# Patient Record
Sex: MALE | Race: WHITE | NOT HISPANIC OR LATINO | URBAN - METROPOLITAN AREA
[De-identification: names, ages, dates, MRNs, and addresses within clinical notes are randomized per-mention and may not be internally consistent; named-entity substitution may affect disease eponyms.]

---

## 2022-09-21 ENCOUNTER — OFFICE VISIT (OUTPATIENT)
Dept: URGENT CARE | Age: 32
End: 2022-09-21
Payer: COMMERCIAL

## 2022-09-21 VITALS
DIASTOLIC BLOOD PRESSURE: 85 MMHG | TEMPERATURE: 98.5 F | HEART RATE: 68 BPM | RESPIRATION RATE: 16 BRPM | OXYGEN SATURATION: 98 % | SYSTOLIC BLOOD PRESSURE: 153 MMHG

## 2022-09-21 DIAGNOSIS — R13.10 DYSPHAGIA, UNSPECIFIED TYPE: Primary | ICD-10-CM

## 2022-09-21 PROCEDURE — 99213 OFFICE O/P EST LOW 20 MIN: CPT

## 2022-09-21 NOTE — PROGRESS NOTES
[facility]        NAME: Colin Treadwell is a 34 y o  male  : 1990    MRN: [id number]  DATE: 2022  TIME: 6:28 PM    Assessment and Plan   Dysphagia, unspecified type [R13 10]  1  Dysphagia, unspecified type      69-year-old male presents for evaluation of difficulty swallowing  There is some bilateral, anterior cervical lymphadenopathy of the anterior cervical chain and submental chains  No obvious pharyngeal swelling or pathology, tracheal breath sounds clear  Will order GI consult as patient is describing the inability to swallow  Patient Instructions   Dysphagia   WHAT YOU NEED TO KNOW:   Dysphagia is trouble swallowing  You may have trouble moving food or liquid from your mouth to your esophagus or down to your stomach  You may have the problem when you eat, drink, or any time you try to swallow  Dysphagia can last a short time, or it can be a permanent problem         DISCHARGE INSTRUCTIONS:   Call your local emergency number (911 in the [address] [place] [address],[address]) if:   · You have chest pain      · You have shortness of breath      Return to the emergency department if:   · You choke on your saliva      · You cannot eat or drink liquids at all      Call your doctor or therapist if:   · You lose weight without trying      · Your signs and symptoms get worse, or you have new signs or symptoms      · You have signs or symptoms of dehydration, such as increased thirst, dark yellow urine, or little or no urine      · You get colds often      · You have questions or concerns about your condition or care      Nutrition:  You may need to change the texture of the foods you eat to help reduce choking problems  Your healthcare provider may show you how to thicken liquids or soften foods to make them easier to swallow  A therapist  can teach you different ways of swallowing by changing your head and body positions  You may be taught exercises to strengthen the muscles that help you swallow    Follow up with your doctor or therapist as directed:  Write down your questions so you remember to ask them during your visits  © Copyright [employer] 2022 Information is for End User's use only and may not be sold, redistributed or otherwise used for commercial purposes  All illustrations and images included in CareNotes® are the copyrighted property of A D A M , Inc  or Aurea Pettit   The above information is an  only  It is not intended as medical advice for individual conditions or treatments  Talk to your doctor, nurse or pharmacist before following any medical regimen to see if it is safe and effective for you            Follow up with PCP in 3-5 days  Proceed to  ER if symptoms worsen  Chief Complaint   No chief complaint on file  History of Present Illness       Patient is a 27-year-old male with no significant past medical history who presents for evaluation of difficulty swallowing over the past week  He reports particular difficulty with swallowing meats and more textured foods, and reports the sensation of neck swelling  Jamil Trevor He has been eating primarily soup and oatmeal   He denies sore throat, fever, neck pain or stiffness, cough, nausea/vomiting/diarrhea  Review of Systems   Review of Systems   Constitutional: Negative for chills, fatigue and fever  HENT: Negative for congestion, ear pain, postnasal drip, rhinorrhea, sinus pressure, sinus pain, sneezing and sore throat  Dysphagia   Eyes: Negative for pain and visual disturbance  Respiratory: Negative  Negative for apnea, cough, choking, chest tightness, shortness of breath, wheezing and stridor  Cardiovascular: Negative for chest pain and palpitations  Gastrointestinal: Negative for abdominal pain, diarrhea, nausea and vomiting  Endocrine: Negative  Genitourinary: Negative  Negative for dysuria and hematuria  Musculoskeletal: Negative for arthralgias, back pain, myalgias, neck pain and neck stiffness  Skin: Negative for color change and rash  Allergic/Immunologic: Negative  Negative for environmental allergies  Neurological: Negative  Negative for dizziness, seizures, syncope, facial asymmetry, light-headedness, numbness and headaches  Hematological: Negative  Negative for adenopathy  Psychiatric/Behavioral: Negative  All other systems reviewed and are negative  Current Medications     No current outpatient medications on file  Current Allergies     Allergies as of 09/21/2022    (Not on File)            The following portions of the patient's history were reviewed and updated as appropriate: allergies, current medications, past family history, past medical history, past social history, past surgical history and problem list      No past medical history on file  No past surgical history on file  No family history on file  Medications have been verified  Objective   There were no vitals taken for this visit  Physical Exam     Physical Exam  Vitals reviewed  Constitutional:       General: He is not in acute distress  Appearance: Normal appearance  He is not ill-appearing, toxic-appearing or diaphoretic  Interventions: He is not intubated  HENT:      Head: Normocephalic and atraumatic  Right Ear: Tympanic membrane, ear canal and external ear normal  There is no impacted cerumen  Left Ear: Tympanic membrane, ear canal and external ear normal  There is no impacted cerumen  Nose: Nose normal  No congestion or rhinorrhea  Mouth/Throat:      Mouth: Mucous membranes are moist       Pharynx: Oropharynx is clear  Uvula midline  No pharyngeal swelling, oropharyngeal exudate, posterior oropharyngeal erythema or uvula swelling  Tonsils: No tonsillar exudate or tonsillar abscesses  1+ on the right  1+ on the left  Eyes:      Extraocular Movements: Extraocular movements intact  Pupils: Pupils are equal, round, and reactive to light  Neck:      Thyroid: No thyroid mass, thyromegaly or thyroid tenderness  Cardiovascular:      Rate and Rhythm: Normal rate and regular rhythm  Pulses: Normal pulses  Heart sounds: Normal heart sounds, S1 normal and S2 normal  Heart sounds not distant  No murmur heard  No friction rub  No gallop  Pulmonary:      Effort: Pulmonary effort is normal  No tachypnea, bradypnea, accessory muscle usage, prolonged expiration, respiratory distress or retractions  He is not intubated  Breath sounds: Normal breath sounds  No stridor, decreased air movement or transmitted upper airway sounds  No decreased breath sounds, wheezing, rhonchi or rales  Chest:      Chest wall: No tenderness  Musculoskeletal:         General: Normal range of motion  Cervical back: Full passive range of motion without pain, normal range of motion and neck supple  No rigidity or tenderness  No spinous process tenderness or muscular tenderness  Normal range of motion  Lymphadenopathy:      Cervical: Cervical adenopathy present  Right cervical: Superficial cervical adenopathy present  No deep or posterior cervical adenopathy  Left cervical: Superficial cervical adenopathy present  No deep or posterior cervical adenopathy  Skin:     General: Skin is warm and dry  Capillary Refill: Capillary refill takes less than 2 seconds  Findings: No erythema  Neurological:      General: No focal deficit present  Mental Status: He is alert     Psychiatric:         Mood and Affect: Mood normal

## 2022-09-27 ENCOUNTER — APPOINTMENT (EMERGENCY)
Dept: CT IMAGING | Facility: HOSPITAL | Age: 32
End: 2022-09-27
Payer: COMMERCIAL

## 2022-09-27 ENCOUNTER — HOSPITAL ENCOUNTER (EMERGENCY)
Facility: HOSPITAL | Age: 32
Discharge: HOME | End: 2022-09-27
Attending: EMERGENCY MEDICINE
Payer: COMMERCIAL

## 2022-09-27 VITALS
DIASTOLIC BLOOD PRESSURE: 58 MMHG | TEMPERATURE: 98.8 F | RESPIRATION RATE: 16 BRPM | HEART RATE: 59 BPM | SYSTOLIC BLOOD PRESSURE: 118 MMHG | OXYGEN SATURATION: 100 %

## 2022-09-27 DIAGNOSIS — R13.10 DYSPHAGIA: Primary | ICD-10-CM

## 2022-09-27 LAB
ALBUMIN SERPL BCP-MCNC: 4.5 G/DL (ref 3.5–5)
ALP SERPL-CCNC: 56 U/L (ref 34–104)
ALT SERPL W P-5'-P-CCNC: 15 U/L (ref 7–52)
ANION GAP SERPL CALCULATED.3IONS-SCNC: 6 MMOL/L (ref 4–13)
AST SERPL W P-5'-P-CCNC: 17 U/L (ref 13–39)
BASOPHILS # BLD AUTO: 0.05 THOUSANDS/ΜL (ref 0–0.1)
BASOPHILS NFR BLD AUTO: 1 % (ref 0–1)
BILIRUB SERPL-MCNC: 1.07 MG/DL (ref 0.2–1)
BUN SERPL-MCNC: 14 MG/DL (ref 5–25)
CALCIUM SERPL-MCNC: 10.2 MG/DL (ref 8.4–10.2)
CHLORIDE SERPL-SCNC: 106 MMOL/L (ref 96–108)
CO2 SERPL-SCNC: 27 MMOL/L (ref 21–32)
CREAT SERPL-MCNC: 1.1 MG/DL (ref 0.6–1.3)
EOSINOPHIL # BLD AUTO: 0.07 THOUSAND/ΜL (ref 0–0.61)
EOSINOPHIL NFR BLD AUTO: 1 % (ref 0–6)
ERYTHROCYTE [DISTWIDTH] IN BLOOD BY AUTOMATED COUNT: 12.2 % (ref 11.6–15.1)
GFR SERPL CREATININE-BSD FRML MDRD: 87 ML/MIN/1.73SQ M
GLUCOSE SERPL-MCNC: 88 MG/DL (ref 65–140)
HCT VFR BLD AUTO: 43.8 % (ref 36.5–49.3)
HGB BLD-MCNC: 15.1 G/DL (ref 12–17)
IMM GRANULOCYTES # BLD AUTO: 0.02 THOUSAND/UL (ref 0–0.2)
IMM GRANULOCYTES NFR BLD AUTO: 0 % (ref 0–2)
LIPASE SERPL-CCNC: 18 U/L (ref 11–82)
LYMPHOCYTES # BLD AUTO: 1.67 THOUSANDS/ΜL (ref 0.6–4.47)
LYMPHOCYTES NFR BLD AUTO: 26 % (ref 14–44)
MCH RBC QN AUTO: 30.2 PG (ref 26.8–34.3)
MCHC RBC AUTO-ENTMCNC: 34.5 G/DL (ref 31.4–37.4)
MCV RBC AUTO: 88 FL (ref 82–98)
MONOCYTES # BLD AUTO: 0.42 THOUSAND/ΜL (ref 0.17–1.22)
MONOCYTES NFR BLD AUTO: 7 % (ref 4–12)
NEUTROPHILS # BLD AUTO: 4.1 THOUSANDS/ΜL (ref 1.85–7.62)
NEUTS SEG NFR BLD AUTO: 65 % (ref 43–75)
NRBC BLD AUTO-RTO: 0 /100 WBCS
PLATELET # BLD AUTO: 296 THOUSANDS/UL (ref 149–390)
PMV BLD AUTO: 9.3 FL (ref 8.9–12.7)
POTASSIUM SERPL-SCNC: 4 MMOL/L (ref 3.5–5.3)
PROT SERPL-MCNC: 7.4 G/DL (ref 6.4–8.4)
RBC # BLD AUTO: 5 MILLION/UL (ref 3.88–5.62)
SODIUM SERPL-SCNC: 139 MMOL/L (ref 135–147)
WBC # BLD AUTO: 6.33 THOUSAND/UL (ref 4.31–10.16)

## 2022-09-27 PROCEDURE — 70491 CT SOFT TISSUE NECK W/DYE: CPT

## 2022-09-27 PROCEDURE — 99284 EMERGENCY DEPT VISIT MOD MDM: CPT

## 2022-09-27 PROCEDURE — 80053 COMPREHEN METABOLIC PANEL: CPT | Performed by: EMERGENCY MEDICINE

## 2022-09-27 PROCEDURE — 85025 COMPLETE CBC W/AUTO DIFF WBC: CPT | Performed by: EMERGENCY MEDICINE

## 2022-09-27 PROCEDURE — 83690 ASSAY OF LIPASE: CPT | Performed by: EMERGENCY MEDICINE

## 2022-09-27 PROCEDURE — G1004 CDSM NDSC: HCPCS

## 2022-09-27 PROCEDURE — 36415 COLL VENOUS BLD VENIPUNCTURE: CPT

## 2022-09-27 RX ORDER — PANTOPRAZOLE SODIUM 40 MG/1
40 TABLET, DELAYED RELEASE ORAL DAILY
Qty: 30 TABLET | Refills: 0 | Status: SHIPPED | OUTPATIENT
Start: 2022-09-27 | End: 2022-09-27 | Stop reason: CLARIF

## 2022-09-27 RX ORDER — PANTOPRAZOLE SODIUM 40 MG/1
40 TABLET, DELAYED RELEASE ORAL DAILY
Qty: 30 TABLET | Refills: 0 | Status: SHIPPED | OUTPATIENT
Start: 2022-09-27 | End: 2022-10-27

## 2022-09-27 RX ADMIN — IOHEXOL 85 ML: 350 INJECTION, SOLUTION INTRAVENOUS at 19:52

## 2022-09-28 NOTE — DISCHARGE INSTRUCTIONS
CT NECK WITH CONTRAST     INDICATION:   Lymphadenopathy, neck  Dysphagia / Sore Throat  Patient complains of intermittent sore throat for one month  COMPARISON:  9/25/2012     TECHNIQUE:  Axial, sagittal, and coronal 2D reformatted images were created from the axial source data and submitted for interpretation  Radiation dose length product (DLP) for this visit:  830 mGy-cm   This examination, like all CT scans performed in the [facility], was performed utilizing techniques to minimize radiation dose exposure, including the use of iterative   reconstruction and automated exposure control  IV Contrast:  85 mL of iohexol (OMNIPAQUE)     IMAGE QUALITY:  Diagnostic  FINDINGS:     VISUALIZED BRAIN PARENCHYMA:  Within normal limits  VISUALIZED ORBITS AND PARANASAL SINUSES:  Within normal limits  NASAL CAVITY AND NASOPHARYNX:  Bilateral natacha bullosa  Midline septum  Otherwise within normal limits  No adenoid enlargement  SUPRAHYOID NECK:    Artifact from dental hardware appears visualization  Mild, symmetric enlargement of the tonsils for age  Preserved parapharyngeal fat  No fluid collections or masses  Normal appearance of the oral cavity, tongue base and epiglottis  INFRAHYOID NECK:  Aryepiglottic folds, piriform sinuses, glottis and subglottic airway are within normal limits  THYROID GLAND:  Within normal limits  PAROTID AND SUBMANDIBULAR GLANDS:  Within normal limits  LYMPH NODES:  No pathologic or enlarged adenopathy  VASCULAR STRUCTURES:  Normal enhancement of the cervical vasculature  THORACIC INLET:  Within normal limits  BONY STRUCTURES: Thoracic levoscoliosis  No suspicious bone lesions or acute fractures  IMPRESSION:     Tonsils are mildly enlarged for age  No mass, abscess or secondary signs of inflammation/infection  No suspicious lymphadenopathy

## 2022-09-28 NOTE — ED PROVIDER NOTES
History  Chief Complaint   Patient presents with    Sore Throat     Sore throat intermittently x 1 month, seen at urgent care last week wuithout swab  Denies fever    Abdominal Pain     Pain left of umbilicus  Started this week  Denies n/v/d  Does report losing weight over last week     Patient is a 56-year-old male presenting with 1 month of sore throat and difficulty swallowing  Patient states that he was in the ER few days ago and diagnosed with dysphagia was given a GI follow-up however they did not call him  Patient presents with similar signs and symptoms has a visit  Patient states that he is having difficulty swallowing foods only been lead liquid diet like soup but has had no difficulty swallowing fluids  Patient states that he has no nausea or vomiting also denies any fevers  Patient states he has mild abdominal pain but thinks that it is likely from him just being hungry  Patient is denies any constipation, diarrhea, chest pain, shortness of breath, fevers, headache, hematochezia, hemoptysis, or any other symptoms at this time          None       History reviewed  No pertinent past medical history  Past Surgical History:   Procedure Laterality Date    WISDOM TOOTH EXTRACTION         History reviewed  No pertinent family history  I have reviewed and agree with the history as documented  E-Cigarette/Vaping     E-Cigarette/Vaping Substances    Nicotine Yes     THC Yes      Social History     Tobacco Use    Smoking status: Current Some Day Smoker    Smokeless tobacco: Never Used   Substance Use Topics    Alcohol use: Not Currently    Drug use: Yes     Types: Marijuana       Review of Systems   Constitutional: Negative for chills and fever  HENT: Positive for trouble swallowing (Able to swallow fluids/liquid diet  difficulty with solid foods especially meat/steak)  Negative for congestion, drooling, ear discharge, ear pain, facial swelling, mouth sores, sinus pressure and sore throat  Eyes: Negative for photophobia, pain and visual disturbance  Respiratory: Negative for cough, choking, chest tightness, shortness of breath, wheezing and stridor  Cardiovascular: Negative for chest pain and palpitations  Gastrointestinal: Negative for abdominal pain, blood in stool, constipation, diarrhea, nausea and vomiting  Genitourinary: Negative for dysuria and hematuria  Musculoskeletal: Negative for arthralgias, back pain, neck pain and neck stiffness  Skin: Negative for color change and rash  Neurological: Negative for dizziness, seizures, syncope, weakness, numbness and headaches  All other systems reviewed and are negative  Physical Exam  Physical Exam  Vitals and nursing note reviewed  Constitutional:       General: He is not in acute distress  Appearance: He is well-developed  He is not ill-appearing  HENT:      Head: Normocephalic and atraumatic  Right Ear: Tympanic membrane and ear canal normal  No drainage  Left Ear: Tympanic membrane and ear canal normal  No drainage  Mouth/Throat:      Mouth: Mucous membranes are moist  No oral lesions  Pharynx: Oropharynx is clear  Uvula midline  No pharyngeal swelling, oropharyngeal exudate, posterior oropharyngeal erythema or uvula swelling  Tonsils: No tonsillar exudate or tonsillar abscesses  0 on the right  0 on the left  Eyes:      Extraocular Movements:      Right eye: Normal extraocular motion  Left eye: Normal extraocular motion  Conjunctiva/sclera: Conjunctivae normal       Pupils: Pupils are equal, round, and reactive to light  Neck:      Thyroid: No thyromegaly  Cardiovascular:      Rate and Rhythm: Normal rate and regular rhythm  Heart sounds: Normal heart sounds  No murmur heard  No friction rub  No gallop  Pulmonary:      Effort: Pulmonary effort is normal  No respiratory distress  Breath sounds: Normal breath sounds  No stridor  No wheezing, rhonchi or rales  Chest:      Chest wall: No tenderness  Abdominal:      General: Bowel sounds are normal  There is no distension  Palpations: Abdomen is soft  There is no mass  Tenderness: There is no abdominal tenderness  There is no guarding or rebound  Hernia: No hernia is present  Musculoskeletal:      Cervical back: Normal range of motion and neck supple  Lymphadenopathy:      Cervical: Cervical adenopathy (1 small swollen node under maxilla - nontender) present  Skin:     General: Skin is warm and dry  Capillary Refill: Capillary refill takes less than 2 seconds  Neurological:      Mental Status: He is alert           Vital Signs  ED Triage Vitals [09/27/22 1724]   Temperature Pulse Respirations Blood Pressure SpO2   98 8 °F (37 1 °C) 69 18 140/81 99 %      Temp Source Heart Rate Source Patient Position - Orthostatic VS BP Location FiO2 (%)   Oral Monitor Sitting Left arm --      Pain Score       --           Vitals:    09/27/22 1724 09/27/22 2021   BP: 140/81 118/58   Pulse: 69 59   Patient Position - Orthostatic VS: Sitting          Visual Acuity      ED Medications  Medications   iohexol (OMNIPAQUE) 350 MG/ML injection (SINGLE-DOSE) 100 mL (85 mL Intravenous Given 9/27/22 1952)       Diagnostic Studies  Results Reviewed     Procedure Component Value Units Date/Time    Comprehensive metabolic panel [[id number]]  (Abnormal) Collected: 09/27/22 1727    Lab Status: Final result Specimen: Blood from Arm, Left Updated: 09/27/22 1802     Sodium 139 mmol/L      Potassium 4 0 mmol/L      Chloride 106 mmol/L      CO2 27 mmol/L      ANION GAP 6 mmol/L      BUN 14 mg/dL      Creatinine 1 10 mg/dL      Glucose 88 mg/dL      Calcium 10 2 mg/dL      AST 17 U/L      ALT 15 U/L      Alkaline Phosphatase 56 U/L      Total Protein 7 4 g/dL      Albumin 4 5 g/dL      Total Bilirubin 1 07 mg/dL      eGFR 87 ml/min/1 73sq m     Narrative:      [place] guidelines for Chronic Kidney Disease (CKD):   Stage 1 with normal or high GFR (GFR > 90 mL/min/1 73 square meters)    Stage 2 Mild CKD (GFR = 60-89 mL/min/1 73 square meters)    Stage 3A Moderate CKD (GFR = 45-59 mL/min/1 73 square meters)    Stage 3B Moderate CKD (GFR = 30-44 mL/min/1 73 square meters)    Stage 4 Severe CKD (GFR = 15-29 mL/min/1 73 square meters)    Stage 5 End Stage CKD (GFR <15 mL/min/1 73 square meters)  Note: GFR calculation is accurate only with a steady state creatinine    Lipase [[id number]]  (Normal) Collected: 09/27/22 1727    Lab Status: Final result Specimen: Blood from Arm, Left Updated: 09/27/22 1802     Lipase 18 u/L     CBC and differential [[id number]] Collected: 09/27/22 1727    Lab Status: Final result Specimen: Blood from Arm, Left Updated: 09/27/22 1739     WBC 6 33 Thousand/uL      RBC 5 00 Million/uL      Hemoglobin 15 1 g/dL      Hematocrit 43 8 %      MCV 88 fL      MCH 30 2 pg      MCHC 34 5 g/dL      RDW 12 2 %      MPV 9 3 fL      Platelets 342 Thousands/uL      nRBC 0 /100 WBCs      Neutrophils Relative 65 %      Immat GRANS % 0 %      Lymphocytes Relative 26 %      Monocytes Relative 7 %      Eosinophils Relative 1 %      Basophils Relative 1 %      Neutrophils Absolute 4 10 Thousands/µL      Immature Grans Absolute 0 02 Thousand/uL      Lymphocytes Absolute 1 67 Thousands/µL      Monocytes Absolute 0 42 Thousand/µL      Eosinophils Absolute 0 07 Thousand/µL      Basophils Absolute 0 05 Thousands/µL                  CT soft tissue neck   Final Result by Zaira Kimball MD (09/27 2041)      Tonsils are mildly enlarged for age  No mass, abscess or secondary signs of inflammation/infection  No suspicious lymphadenopathy        Workstation performed: [id number]                    Procedures  Procedures         ED Course  ED Course as of 09/28/22 1106   Tue Sep 27, 2022   1940 CT soft tissue neck                                             MDM  Number of Diagnoses or Management Options  Dysphagia  Diagnosis management comments: 58-year-old male presenting with signs and symptoms of dysphagia  Patient is still able to swallow fluids as well as liquid diet  CT soft tissue neck to rule out any acute process  Came back as negative for any acute process or infection  Patient was given another follow-up with GI as well as given the phone number  Patient instructed to call them by the end of today around 2:00 p m  if he does not get a call from GI  Patient instructed that he needs an endoscopy to diagnose the issue  Patient also stated that he had mild esophageal burning with  Patient was given a trial of Protonix to help with his symptoms         Disposition  Final diagnoses:   Dysphagia     Time reflects when diagnosis was documented in both MDM as applicable and the Disposition within this note     Time User Action Codes Description Comment    9/27/2022  8:44 PM Debbie Caputo Add [R13 10] Dysphagia       ED Disposition     ED Disposition   Discharge    Condition   Stable    Date/Time   Tue Sep 27, 2022  8:44 PM    Comment   Stefano Craft discharge to home/self care                 Follow-up Information     Follow up With Specialties Details Why Contact Info Additional [address] Emergency Department Emergency Medicine Go to  If symptoms worsen [address] Emergency Department, [address], [place], [place], [address]    [facility] Specialty [facility] Gastroenterology Call  As needed [id number] [facility] [address] [facility] Gastroenterology Specialists [facility], [address], [address] [place], [place], [address], [phone]          Discharge Medication List as of 9/27/2022  8:54 PM      START taking these medications    Details   pantoprazole (PROTONIX) 40 mg tablet Take 1 tablet (40 mg total) by mouth daily, Starting Tue 9/27/2022, Until Thu 10/27/2022, Normal                 PDMP Review     None          ED Provider  Electronically Signed by           Jamil Moraes PA-C  09/28/22 1512

## 2022-10-03 ENCOUNTER — HOSPITAL ENCOUNTER (EMERGENCY)
Facility: HOSPITAL | Age: 32
Discharge: LEFT BEFORE DISCHARGE | End: 2022-10-04
Payer: COMMERCIAL

## 2022-10-03 VITALS
OXYGEN SATURATION: 99 % | TEMPERATURE: 98.1 F | SYSTOLIC BLOOD PRESSURE: 160 MMHG | RESPIRATION RATE: 18 BRPM | DIASTOLIC BLOOD PRESSURE: 83 MMHG | HEART RATE: 76 BPM

## 2022-10-03 LAB
ALBUMIN SERPL BCP-MCNC: 4.8 G/DL (ref 3.5–5)
ALP SERPL-CCNC: 58 U/L (ref 34–104)
ALT SERPL W P-5'-P-CCNC: 13 U/L (ref 7–52)
ANION GAP SERPL CALCULATED.3IONS-SCNC: 9 MMOL/L (ref 4–13)
AST SERPL W P-5'-P-CCNC: 17 U/L (ref 13–39)
BASOPHILS # BLD AUTO: 0.03 THOUSANDS/ΜL (ref 0–0.1)
BASOPHILS NFR BLD AUTO: 1 % (ref 0–1)
BILIRUB SERPL-MCNC: 1.26 MG/DL (ref 0.2–1)
BUN SERPL-MCNC: 17 MG/DL (ref 5–25)
CALCIUM SERPL-MCNC: 9.9 MG/DL (ref 8.4–10.2)
CARDIAC TROPONIN I PNL SERPL HS: <2 NG/L
CHLORIDE SERPL-SCNC: 102 MMOL/L (ref 96–108)
CO2 SERPL-SCNC: 26 MMOL/L (ref 21–32)
CREAT SERPL-MCNC: 1.07 MG/DL (ref 0.6–1.3)
EOSINOPHIL # BLD AUTO: 0.05 THOUSAND/ΜL (ref 0–0.61)
EOSINOPHIL NFR BLD AUTO: 1 % (ref 0–6)
ERYTHROCYTE [DISTWIDTH] IN BLOOD BY AUTOMATED COUNT: 11.9 % (ref 11.6–15.1)
GFR SERPL CREATININE-BSD FRML MDRD: 90 ML/MIN/1.73SQ M
GLUCOSE SERPL-MCNC: 92 MG/DL (ref 65–140)
HCT VFR BLD AUTO: 44.7 % (ref 36.5–49.3)
HGB BLD-MCNC: 15.5 G/DL (ref 12–17)
IMM GRANULOCYTES # BLD AUTO: 0.01 THOUSAND/UL (ref 0–0.2)
IMM GRANULOCYTES NFR BLD AUTO: 0 % (ref 0–2)
LYMPHOCYTES # BLD AUTO: 1.15 THOUSANDS/ΜL (ref 0.6–4.47)
LYMPHOCYTES NFR BLD AUTO: 26 % (ref 14–44)
MCH RBC QN AUTO: 29.9 PG (ref 26.8–34.3)
MCHC RBC AUTO-ENTMCNC: 34.7 G/DL (ref 31.4–37.4)
MCV RBC AUTO: 86 FL (ref 82–98)
MONOCYTES # BLD AUTO: 0.31 THOUSAND/ΜL (ref 0.17–1.22)
MONOCYTES NFR BLD AUTO: 7 % (ref 4–12)
NEUTROPHILS # BLD AUTO: 2.95 THOUSANDS/ΜL (ref 1.85–7.62)
NEUTS SEG NFR BLD AUTO: 65 % (ref 43–75)
NRBC BLD AUTO-RTO: 0 /100 WBCS
PLATELET # BLD AUTO: 288 THOUSANDS/UL (ref 149–390)
PMV BLD AUTO: 9.2 FL (ref 8.9–12.7)
POTASSIUM SERPL-SCNC: 4 MMOL/L (ref 3.5–5.3)
PROT SERPL-MCNC: 7.6 G/DL (ref 6.4–8.4)
RBC # BLD AUTO: 5.18 MILLION/UL (ref 3.88–5.62)
SODIUM SERPL-SCNC: 137 MMOL/L (ref 135–147)
WBC # BLD AUTO: 4.5 THOUSAND/UL (ref 4.31–10.16)

## 2022-10-03 PROCEDURE — 93005 ELECTROCARDIOGRAM TRACING: CPT

## 2022-10-03 PROCEDURE — 85025 COMPLETE CBC W/AUTO DIFF WBC: CPT

## 2022-10-03 PROCEDURE — 84484 ASSAY OF TROPONIN QUANT: CPT

## 2022-10-03 PROCEDURE — 80053 COMPREHEN METABOLIC PANEL: CPT

## 2022-10-03 PROCEDURE — 36415 COLL VENOUS BLD VENIPUNCTURE: CPT

## 2022-10-04 LAB
ATRIAL RATE: 92 BPM
P AXIS: 70 DEGREES
PR INTERVAL: 146 MS
QRS AXIS: 84 DEGREES
QRSD INTERVAL: 100 MS
QT INTERVAL: 356 MS
QTC INTERVAL: 440 MS
T WAVE AXIS: 37 DEGREES
VENTRICULAR RATE: 92 BPM

## 2022-10-04 PROCEDURE — 93010 ELECTROCARDIOGRAM REPORT: CPT | Performed by: INTERNAL MEDICINE

## 2022-10-27 ENCOUNTER — OFFICE VISIT (OUTPATIENT)
Dept: INTERNAL MEDICINE CLINIC | Facility: CLINIC | Age: 32
End: 2022-10-27

## 2022-10-27 VITALS
SYSTOLIC BLOOD PRESSURE: 126 MMHG | WEIGHT: 221.6 LBS | HEIGHT: 75 IN | OXYGEN SATURATION: 99 % | BODY MASS INDEX: 27.55 KG/M2 | HEART RATE: 85 BPM | DIASTOLIC BLOOD PRESSURE: 84 MMHG

## 2022-10-27 DIAGNOSIS — Z13.31 POSITIVE DEPRESSION SCREENING: ICD-10-CM

## 2022-10-27 DIAGNOSIS — F41.1 GAD (GENERALIZED ANXIETY DISORDER): ICD-10-CM

## 2022-10-27 DIAGNOSIS — R13.19 ESOPHAGEAL DYSPHAGIA: Primary | ICD-10-CM

## 2022-10-27 NOTE — PROGRESS NOTES
Assessment/Plan:    1  Esophageal dysphagia    2  YOSI (generalized anxiety disorder)    3  Positive depression screening    The case discussed with patient using patient centered shared decision making  The patient was counseled regarding instructions for management,-- risk factor reductions,-- prognosis,-- impressions,-- risks and benefits of treatment options,-- importance of compliance with treatment  I have reviewed the instructions with the patient, answering all questions to his satisfaction  Exam unrevealing  Patient well nourished, euvolemic    Scheduled with Gastro next week  Will need EGD for r/o HH, esophageal stricture, gastritis, esophagitis    Advised soft bland diet    Pt declines rx/counseling for depression, anxiety    Recheck in 2 weeks    BMI Counseling: Body mass index is 27 7 kg/m²  The BMI is above normal  Nutrition recommendations include decreasing portion sizes, moderation in carbohydrate intake and increasing intake of lean protein  Exercise recommendations include exercising 3-5 times per week  Rationale for BMI follow-up plan is due to patient being overweight or obese  Depression Screening and Follow-up Plan: Patient's depression screening was positive with a PHQ-2 score of 4  Their PHQ-9 score was 16  Depression Screening Follow-up Plan: Patient's depression screening was positive with a PHQ-2 score of 4  Their PHQ-9 score was 16  Patient declines further evaluation by mental health professional and/or medications  They have no active suicidal ideations  Brief counseling provided and recommend additional follow-up/re-evaluation at next office visit  There are no Patient Instructions on file for this visit  Return in about 2 weeks (around 11/10/2022)      I have spent 35 minutes with Patient  today in which greater than 50% of this time was spent in counseling/coordination of care regarding Diagnostic results, Prognosis, Risks and benefits of tx options, Intructions for management, Patient and family education, Importance of tx compliance, Risk factor reductions and Impressions  Subjective:      Patient ID: Magali Cranker is a 34 y o  male  Chief Complaint   Patient presents with   • Abdominal Pain   • Heartburn   • Numbness     Left hand   • Weight Loss     30 lbs over 1 5 months, unable to swallow  • Anxiety       Patient presents for multiple c/o    History obtained from chart review and the patient  Patient reports ongoing swallowing problems for approx 6 weeks  Food gets stuck   Often vomits  Has generalized abd pain which comes and goes  Better with mylanta  Now following a liquid diet  Having constipation  Was seen by [facility] for same x 2    Has recently intentionally lost weight in past 2 years--was 380 pounds  Denies h/o GERD, PUD, esophagitis, IBD    Additionally having adjustment reaction/anxiety since covid, mother's cancer diagnosis  Was drinking heavily, uses [employer]    Has since quit ETOH          The following portions of the patient's history were reviewed and updated as appropriate: allergies, current medications, past family history, past medical history, past social history, past surgical history and problem list     Review of Systems   Constitutional: Positive for fatigue  Negative for fever and unexpected weight change  HENT: Positive for trouble swallowing  Respiratory: Negative  Cardiovascular: Negative for chest pain and palpitations  Gastrointestinal: Positive for abdominal distention, abdominal pain, constipation, nausea and vomiting  Negative for blood in stool  Neurological: Negative  Psychiatric/Behavioral: Positive for dysphoric mood  Negative for sleep disturbance and suicidal ideas  The patient is nervous/anxious            Current Outpatient Medications   Medication Sig Dispense Refill   • pantoprazole (PROTONIX) 40 mg tablet Take 1 tablet (40 mg total) by mouth daily (Patient not taking: Reported on 10/27/2022) 30 tablet 0     No current facility-administered medications for this visit  Objective:    /84   Pulse 85   Ht 6' 3" (1 905 m)   Wt 101 kg (221 lb 9 6 oz)   SpO2 99%   BMI 27 70 kg/m²        Physical Exam  Vitals and nursing note reviewed  Constitutional:       General: He is not in acute distress  Appearance: He is well-developed  He is not ill-appearing  HENT:      Head: Normocephalic and atraumatic  Cardiovascular:      Rate and Rhythm: Normal rate and regular rhythm  Heart sounds: Normal heart sounds  Pulmonary:      Effort: Pulmonary effort is normal       Breath sounds: Normal breath sounds  Abdominal:      General: Bowel sounds are normal  There is no distension  Palpations: Abdomen is soft  There is no hepatomegaly, splenomegaly or mass  Tenderness: There is no abdominal tenderness  There is no guarding or rebound  Hernia: No hernia is present  Skin:     General: Skin is warm and dry  Coloration: Skin is not pale  Neurological:      General: No focal deficit present  Mental Status: He is alert     Psychiatric:         Mood and Affect: Mood normal                 Brandon Thornton

## 2022-11-03 ENCOUNTER — APPOINTMENT (OUTPATIENT)
Dept: RADIOLOGY | Facility: HOSPITAL | Age: 32
End: 2022-11-03

## 2022-11-03 ENCOUNTER — HOSPITAL ENCOUNTER (EMERGENCY)
Facility: HOSPITAL | Age: 32
Discharge: HOME | End: 2022-11-03
Attending: EMERGENCY MEDICINE

## 2022-11-03 VITALS
SYSTOLIC BLOOD PRESSURE: 133 MMHG | RESPIRATION RATE: 18 BRPM | BODY MASS INDEX: 26.11 KG/M2 | TEMPERATURE: 99.4 F | WEIGHT: 210 LBS | OXYGEN SATURATION: 100 % | DIASTOLIC BLOOD PRESSURE: 65 MMHG | HEART RATE: 74 BPM | HEIGHT: 75 IN

## 2022-11-03 DIAGNOSIS — Z77.098 ACCIDENTAL EXPOSURE TO CARBON MONOXIDE: Primary | ICD-10-CM

## 2022-11-03 DIAGNOSIS — R51.9 HEADACHE: ICD-10-CM

## 2022-11-03 LAB
ALBUMIN SERPL BCP-MCNC: 4.4 G/DL (ref 3.5–5)
ALP SERPL-CCNC: 66 U/L (ref 46–116)
ALT SERPL W P-5'-P-CCNC: 24 U/L (ref 12–78)
ANION GAP SERPL CALCULATED.3IONS-SCNC: 11 MMOL/L (ref 4–13)
BASOPHILS # BLD AUTO: 0.06 THOUSANDS/ÂΜL (ref 0–0.1)
BASOPHILS NFR BLD AUTO: 1 % (ref 0–1)
BILIRUB SERPL-MCNC: 0.9 MG/DL (ref 0.2–1)
BUN SERPL-MCNC: 15 MG/DL (ref 5–25)
CALCIUM SERPL-MCNC: 9.1 MG/DL (ref 8.3–10.1)
CARDIAC TROPONIN I PNL SERPL HS: <2 NG/L
CHLORIDE SERPL-SCNC: 104 MMOL/L (ref 96–108)
CO2 SERPL-SCNC: 25 MMOL/L (ref 21–32)
CREAT SERPL-MCNC: 1.01 MG/DL (ref 0.6–1.3)
EOSINOPHIL # BLD AUTO: 0.07 THOUSAND/ÂΜL (ref 0–0.61)
EOSINOPHIL NFR BLD AUTO: 1 % (ref 0–6)
ERYTHROCYTE [DISTWIDTH] IN BLOOD BY AUTOMATED COUNT: 11.5 % (ref 11.6–15.1)
GAS + CO PNL BLDA: 3 % (ref 0–1.5)
GFR SERPL CREATININE-BSD FRML MDRD: 97 ML/MIN/1.73SQ M
GLUCOSE SERPL-MCNC: 110 MG/DL (ref 65–140)
HCT VFR BLD AUTO: 43.1 % (ref 36.5–49.3)
HGB BLD-MCNC: 15.3 G/DL (ref 12–17)
IMM GRANULOCYTES # BLD AUTO: 0.02 THOUSAND/UL (ref 0–0.2)
IMM GRANULOCYTES NFR BLD AUTO: 0 % (ref 0–2)
LYMPHOCYTES # BLD AUTO: 1.46 THOUSANDS/ÂΜL (ref 0.6–4.47)
LYMPHOCYTES NFR BLD AUTO: 23 % (ref 14–44)
MCH RBC QN AUTO: 30.7 PG (ref 26.8–34.3)
MCHC RBC AUTO-ENTMCNC: 35.5 G/DL (ref 31.4–37.4)
MCV RBC AUTO: 86 FL (ref 82–98)
MONOCYTES # BLD AUTO: 0.34 THOUSAND/ÂΜL (ref 0.17–1.22)
MONOCYTES NFR BLD AUTO: 5 % (ref 4–12)
NEUTROPHILS # BLD AUTO: 4.29 THOUSANDS/ÂΜL (ref 1.85–7.62)
NEUTS SEG NFR BLD AUTO: 70 % (ref 43–75)
NRBC BLD AUTO-RTO: 0 /100 WBCS
PLATELET # BLD AUTO: 285 THOUSANDS/UL (ref 149–390)
PMV BLD AUTO: 9.4 FL (ref 8.9–12.7)
POTASSIUM SERPL-SCNC: 4.1 MMOL/L (ref 3.5–5.3)
PROT SERPL-MCNC: 7.7 G/DL (ref 6.4–8.4)
RBC # BLD AUTO: 4.99 MILLION/UL (ref 3.88–5.62)
SODIUM SERPL-SCNC: 140 MMOL/L (ref 135–147)
WBC # BLD AUTO: 6.24 THOUSAND/UL (ref 4.31–10.16)

## 2022-11-03 NOTE — ED PROVIDER NOTES
History  Chief Complaint   Patient presents with   • Headache     Pt reports that on and off for the past couple weeks he has been experiencing headache, sob, and some chest pain "every other day"  States that he is a , felt better and then went back to work  Is requesting to be tested for carbon monoxide  58-year-old male presents for evaluation of headache, facial flushing and concern for possible exposure to carbon monoxide  Patient works indoors with a forklift  He has been getting intermittent headaches  Patient states symptoms are significantly improved since leaving work but still has a mild frontal headache  No pain medications prior to arrival   Patient states he also smokes daily  Prior to Admission Medications   Prescriptions Last Dose Informant Patient Reported? Taking? pantoprazole (PROTONIX) 40 mg tablet   No No   Sig: Take 1 tablet (40 mg total) by mouth daily   Patient not taking: Reported on 10/27/2022      Facility-Administered Medications: None       History reviewed  No pertinent past medical history  Past Surgical History:   Procedure Laterality Date   • WISDOM TOOTH EXTRACTION         Family History   Problem Relation Age of Onset   • Cancer Mother    • Breast cancer Mother    • No Known Problems Brother    • No Known Problems Brother      I have reviewed and agree with the history as documented  E-Cigarette/Vaping   • E-Cigarette Use Current Every Day User      E-Cigarette/Vaping Substances   • Nicotine Yes    • THC Yes    • CBD No    • Flavoring No    • Other No    • Unknown No      Social History     Tobacco Use   • Smoking status: Current Some Day Smoker     Types: Cigarettes   • Smokeless tobacco: Never Used   Vaping Use   • Vaping Use: Every day   • Substances: Nicotine, THC   Substance Use Topics   • Alcohol use: Not Currently   • Drug use: Yes     Types: Marijuana       Review of Systems   Constitutional: Negative for fever     Neurological: Positive for headaches  All other systems reviewed and are negative  Physical Exam  Physical Exam  Vitals and nursing note reviewed  Constitutional:       General: He is not in acute distress  Appearance: He is well-developed  HENT:      Head: Normocephalic and atraumatic  Right Ear: External ear normal       Left Ear: External ear normal       Nose: Nose normal    Eyes:      General: No scleral icterus  Extraocular Movements: Extraocular movements intact  Pupils: Pupils are equal, round, and reactive to light  Pulmonary:      Effort: Pulmonary effort is normal  No respiratory distress  Abdominal:      General: There is no distension  Palpations: Abdomen is soft  Musculoskeletal:         General: No deformity  Normal range of motion  Cervical back: Normal range of motion and neck supple  Comments: 5/5 strength of bilateral upper and lower extremities  Skin:     General: Skin is warm  Findings: No rash  Neurological:      General: No focal deficit present  Mental Status: He is alert        Gait: Gait normal    Psychiatric:         Mood and Affect: Mood normal          Vital Signs  ED Triage Vitals   Temperature Pulse Respirations Blood Pressure SpO2   11/03/22 1335 11/03/22 1335 11/03/22 1335 11/03/22 1335 11/03/22 1335   99 4 °F (37 4 °C) 91 18 139/83 98 %      Temp Source Heart Rate Source Patient Position - Orthostatic VS BP Location FiO2 (%)   11/03/22 1335 11/03/22 1335 11/03/22 1335 11/03/22 1335 --   Temporal Monitor Sitting Left arm       Pain Score       11/03/22 1532       No Pain           Vitals:    11/03/22 1335 11/03/22 1532 11/03/22 1810   BP: 139/83 140/76 133/65   Pulse: 91 77 74   Patient Position - Orthostatic VS: Sitting Sitting Sitting         Visual Acuity      ED Medications  Medications - No data to display    Diagnostic Studies  Results Reviewed     Procedure Component Value Units Date/Time    Carboxyhemoglobin [[id number]]  (Abnormal) Collected: 11/03/22 1803    Lab Status: Final result Specimen: Blood from Arm, Right Updated: 11/03/22 1829     Carbon Monoxide, Blood 3 0 %     Narrative:       Therapeutic levels (1 mg/mL and 2 mg/mL) of hydroxocobalamin may interfere with the fCOHb and fMetHb where it may cause lower than expected values  Normal Carboxyhemoglobin range for nonsmokers is <1 5%   Normal Carboxyhemoglobin range for smokers is 1 5% to 5 1%     Comprehensive metabolic panel [[id number]] Collected: 11/03/22 1338    Lab Status: Final result Specimen: Blood from Arm, Right Updated: 11/03/22 1424     Sodium 140 mmol/L      Potassium 4 1 mmol/L      Chloride 104 mmol/L      CO2 25 mmol/L      ANION GAP 11 mmol/L      BUN 15 mg/dL      Creatinine 1 01 mg/dL      Glucose 110 mg/dL      Calcium 9 1 mg/dL      AST --     ALT 24 U/L      Alkaline Phosphatase 66 U/L      Total Protein 7 7 g/dL      Albumin 4 4 g/dL      Total Bilirubin 0 90 mg/dL      eGFR 97 ml/min/1 73sq m     Narrative:      [facility] guidelines for Chronic Kidney Disease (CKD):   •  Stage 1 with normal or high GFR (GFR > 90 mL/min/1 73 square meters)  •  Stage 2 Mild CKD (GFR = 60-89 mL/min/1 73 square meters)  •  Stage 3A Moderate CKD (GFR = 45-59 mL/min/1 73 square meters)  •  Stage 3B Moderate CKD (GFR = 30-44 mL/min/1 73 square meters)  •  Stage 4 Severe CKD (GFR = 15-29 mL/min/1 73 square meters)  •  Stage 5 End Stage CKD (GFR <15 mL/min/1 73 square meters)  Note: GFR calculation is accurate only with a steady state creatinine    HS Troponin 0hr (reflex protocol) [[id number]]  (Normal) Collected: 11/03/22 1338    Lab Status: Final result Specimen: Blood from Arm, Right Updated: 11/03/22 1412     hs TnI 0hr <2 ng/L     CBC and differential [[id number]]  (Abnormal) Collected: 11/03/22 1338    Lab Status: Final result Specimen: Blood from Arm, Right Updated: 11/03/22 1345     WBC 6 24 Thousand/uL      RBC 4 99 Million/uL      Hemoglobin 15 3 g/dL Hematocrit 43 1 %      MCV 86 fL      MCH 30 7 pg      MCHC 35 5 g/dL      RDW 11 5 %      MPV 9 4 fL      Platelets 409 Thousands/uL      nRBC 0 /100 WBCs      Neutrophils Relative 70 %      Immat GRANS % 0 %      Lymphocytes Relative 23 %      Monocytes Relative 5 %      Eosinophils Relative 1 %      Basophils Relative 1 %      Neutrophils Absolute 4 29 Thousands/µL      Immature Grans Absolute 0 02 Thousand/uL      Lymphocytes Absolute 1 46 Thousands/µL      Monocytes Absolute 0 34 Thousand/µL      Eosinophils Absolute 0 07 Thousand/µL      Basophils Absolute 0 06 Thousands/µL                  XR chest pa & lateral   Final Result by Moi Gutierrez MD (11/03 1631)      No acute cardiopulmonary disease  Workstation performed: [id number]                    Procedures  Procedures         ED Course             HEART Risk Score    Flowsheet Row Most Recent Value   Heart Score Risk Calculator    History 0 Filed at: 11/03/2022 1921   ECG 0 Filed at: 11/03/2022 1921   Age 0 Filed at: 11/03/2022 1921   Risk Factors 1 Filed at: 11/03/2022 1921   Troponin 0 Filed at: 11/03/2022 1921   HEART Score 1 Filed at: 11/03/2022 1921                                      MDM  Number of Diagnoses or Management Options  Accidental exposure to carbon monoxide  Headache: new and requires workup  Diagnosis management comments: 79-year-old male presenting with headache, facial flushing and concern for carbon monoxide and  Nasal cannula oxygen administered  The spot call oximetry reveals 4-7%  Will send blood carboxyhemoglobin  Patient was complete resolution of symptoms  Discussed importance of formal testing at work or carbon oxide  Return precautions discussed         Amount and/or Complexity of Data Reviewed  Clinical lab tests: reviewed and ordered  Tests in the radiology section of CPT®: reviewed and ordered  Tests in the medicine section of CPT®: ordered and reviewed  Decide to obtain previous medical records or to obtain history from someone other than the patient: yes  Review and summarize past medical records: yes        Disposition  Final diagnoses:   Accidental exposure to carbon monoxide   Headache     Time reflects when diagnosis was documented in both MDM as applicable and the Disposition within this note     Time User Action Codes Description Comment    11/3/2022  6:09 PM Arline Srivastava [H97 130] Accidental exposure to carbon monoxide     11/3/2022  6:09 PM Arline Srivastava [R51 9] Headache       ED Disposition     ED Disposition   Discharge    Condition   Stable    Date/Time   Thu Nov 3, 2022  6:09 PM    Comment   Jane Calvillo discharge to home/self care  Follow-up Information     Follow up With Specialties Details Why Contact Info Additional [address], [address] [facility], Nurse Practitioner   [place] [address] [place] [address] [phone]        [address] Emergency Department Emergency Medicine  If symptoms worsen [address] [address]  [address] [facility] Emergency Department, [address], [facility], [facility] [address]          Discharge Medication List as of 11/3/2022  6:10 PM      CONTINUE these medications which have NOT CHANGED    Details   pantoprazole (PROTONIX) 40 mg tablet Take 1 tablet (40 mg total) by mouth daily, Starting Tue 9/27/2022, Until Thu 10/27/2022, Normal             No discharge procedures on file      PDMP Review     None          ED Provider  Electronically Signed by           Tom Woody DO  11/03/22 1921

## 2022-11-03 NOTE — ED NOTES
Respiratory in triage testing for carbon monoxide       Results: [id number] [facility] [address] Blanca Engel  11/03/22 0416

## 2022-11-04 ENCOUNTER — OFFICE VISIT (OUTPATIENT)
Dept: GASTROENTEROLOGY | Facility: CLINIC | Age: 32
End: 2022-11-04

## 2022-11-04 ENCOUNTER — TELEPHONE (OUTPATIENT)
Dept: PULMONOLOGY | Facility: CLINIC | Age: 32
End: 2022-11-04

## 2022-11-04 VITALS
SYSTOLIC BLOOD PRESSURE: 119 MMHG | HEIGHT: 75 IN | DIASTOLIC BLOOD PRESSURE: 73 MMHG | WEIGHT: 210 LBS | HEART RATE: 80 BPM | BODY MASS INDEX: 26.11 KG/M2

## 2022-11-04 DIAGNOSIS — T58.91XD TOXIC EFFECT OF CARBON MONOXIDE, UNINTENTIONAL, SUBSEQUENT ENCOUNTER: ICD-10-CM

## 2022-11-04 DIAGNOSIS — K20.0 EOSINOPHILIC ESOPHAGITIS: Primary | ICD-10-CM

## 2022-11-04 DIAGNOSIS — R13.10 DYSPHAGIA: ICD-10-CM

## 2022-11-04 DIAGNOSIS — R00.0 TACHYCARDIA: ICD-10-CM

## 2022-11-04 DIAGNOSIS — R06.02 SHORTNESS OF BREATH: ICD-10-CM

## 2022-11-04 LAB
ATRIAL RATE: 100 BPM
P AXIS: 66 DEGREES
PR INTERVAL: 134 MS
QRS AXIS: 86 DEGREES
QRSD INTERVAL: 96 MS
QT INTERVAL: 350 MS
QTC INTERVAL: 451 MS
T WAVE AXIS: 23 DEGREES
VENTRICULAR RATE: 100 BPM

## 2022-11-04 NOTE — TELEPHONE ENCOUNTER
Alie Johnson from [place] [address] called stating patient was in their office today, needs an urgent appt for pulmonary, referral is in, please schedule, thank you

## 2022-11-04 NOTE — PROGRESS NOTES
[facility] - Outpatient Consultation  Magali Cranker 34 y o  male MRN: [id number]  Encounter: [id number]          ASSESSMENT AND PLAN:      1  Dysphagia  Patient has history of difficulty in swallowing  This is going on for at least four months  Initially this was subtle and lately it has become quite a bit significant  He has been drinking soup mostly for the last few weeks  He denies any choking sensation  He did not have any food impaction  He was started on pantoprazole and is feeling somewhat better  Cause of this remains uncertain  Possibility of eosinophilic esophagitis or esophageal stricture cannot be excluded  Patient needs upper endoscopy however he has significant shortness of breath on exertion and palpitation  He needs to be seen by pulmonology for evaluation of carbon monoxide pausing and any ill effect on his lungs  Upper endoscopy will be scheduled subsequently  - Ambulatory Referral to Gastroenterology  - EGD; Future    2  Eosinophilic esophagitis  Patient does not have any significant history of allergies  There is no history of food allergy  Eosinophilic esophagitis remains a possibility  Endoscopy will be done  3  Toxic effect of carbon monoxide, unintentional, subsequent encounter  Patient had exposure to carbon monoxide which was accidental at the place of work  Patient wants emergency room  His carbon monoxide level was high  Patient was sent home for rest and evaluation   - Ambulatory Referral to Pulmonology; Future    4  Tachycardia  Patient has fast heartbeat only during attacks of shortness of breath  He has always been healthy otherwise  Patient will need evaluation by Pulmonary   - Ambulatory Referral to Pulmonology; Future    5  Shortness of breath  See above description   - Ambulatory Referral to Pulmonology; Future    ______________________________________________________________________    HPI:  Recent common oxide exposure  Patient has been having difficulty in swallowing for the last few months  There is no history of allergies  He has subtle heartburn  Does not have any abdominal pain or discomfort  There is no prior history of peptic ulcer disease or gastroesophageal reflux disease  Upper endoscopy has never been done  There is no significant family history  Patient will need endoscopy after clearance from Pulmonary  REVIEW OF SYSTEMS:    CONSTITUTIONAL: Denies any fever, chills, rigors, and weight loss  HEENT: No earache or tinnitus  Denies hearing loss or visual disturbances  CARDIOVASCULAR: No chest pain or palpitations  RESPIRATORY: Denies any cough, hemoptysis, shortness of breath or dyspnea on exertion  GASTROINTESTINAL: As noted in the History of Present Illness  GENITOURINARY: No problems with urination  Denies any hematuria or dysuria  NEUROLOGIC: No dizziness or vertigo, denies headaches  MUSCULOSKELETAL: Denies any muscle or joint pain  SKIN: Denies skin rashes or itching  ENDOCRINE: Denies excessive thirst  Denies intolerance to heat or cold  PSYCHOSOCIAL: Denies depression or anxiety  Denies any recent memory loss  Historical Information   History reviewed  No pertinent past medical history    Past Surgical History:   Procedure Laterality Date   • WISDOM TOOTH EXTRACTION       Social History   Social History     Substance and Sexual Activity   Alcohol Use Not Currently     Social History     Substance and Sexual Activity   Drug Use Yes   • Types: Marijuana     Social History     Tobacco Use   Smoking Status Current Some Day Smoker   • Types: Cigarettes   Smokeless Tobacco Never Used     Family History   Problem Relation Age of Onset   • Cancer Mother    • Breast cancer Mother    • No Known Problems Brother    • No Known Problems Brother        Meds/Allergies       Current Outpatient Medications:   •  Multiple Vitamin (MULTIVITAMIN ADULT PO)  •  pantoprazole (PROTONIX) 40 mg tablet    No Known Allergies        Objective     Blood pressure 119/73, pulse 80, height 6' 3" (1 905 m), weight 95 3 kg (210 lb)  Body mass index is 26 25 kg/m²  PHYSICAL EXAM:      General Appearance:   Alert, cooperative, no distress   HEENT:   Normocephalic, atraumatic, anicteric      Neck:  Supple, symmetrical, trachea midline   Lungs:   Clear to auscultation bilaterally; no rales, rhonchi or wheezing; respirations unlabored    Heart[de-identified]   Regular rate and rhythm; no murmur, rub, or gallop  Abdomen:   Soft, non-tender, non-distended; normal bowel sounds; no masses, no organomegaly    Genitalia:   Deferred    Rectal:   Deferred    Extremities:  No cyanosis, clubbing or edema    Pulses:  2+ and symmetric    Skin:  No jaundice, rashes, or lesions    Lymph nodes:  No palpable cervical lymphadenopathy        Lab Results:   No visits with results within 1 Day(s) from this visit     Latest known visit with results is:   Admission on 11/03/2022, Discharged on 11/03/2022   Component Date Value   • WBC 11/03/2022 6 24    • RBC 11/03/2022 4 99    • Hemoglobin 11/03/2022 15 3    • Hematocrit 11/03/2022 43 1    • MCV 11/03/2022 86    • MCH 11/03/2022 30 7    • MCHC 11/03/2022 35 5    • RDW 11/03/2022 11 5 (A)   • MPV 11/03/2022 9 4    • Platelets 26/02/3571 285    • nRBC 11/03/2022 0    • Neutrophils Relative 11/03/2022 70    • Immat GRANS % 11/03/2022 0    • Lymphocytes Relative 11/03/2022 23    • Monocytes Relative 11/03/2022 5    • Eosinophils Relative 11/03/2022 1    • Basophils Relative 11/03/2022 1    • Neutrophils Absolute 11/03/2022 4 29    • Immature Grans Absolute 11/03/2022 0 02    • Lymphocytes Absolute 11/03/2022 1 46    • Monocytes Absolute 11/03/2022 0 34    • Eosinophils Absolute 11/03/2022 0 07    • Basophils Absolute 11/03/2022 0 06    • Sodium 11/03/2022 140    • Potassium 11/03/2022 4 1    • Chloride 11/03/2022 104    • CO2 11/03/2022 25    • ANION GAP 11/03/2022 11    • BUN 11/03/2022 15    • Creatinine 11/03/2022 1 01    • Glucose 11/03/2022 110    • Calcium 11/03/2022 9 1    • AST 11/03/2022     • ALT 11/03/2022 24    • Alkaline Phosphatase 11/03/2022 66    • Total Protein 11/03/2022 7 7    • Albumin 11/03/2022 4 4    • Total Bilirubin 11/03/2022 0 90    • eGFR 11/03/2022 97    • hs TnI 0hr 11/03/2022 <2    • Carbon Monoxide, Blood 11/03/2022 3 0 (A)         Radiology Results:   XR chest pa & lateral    Result Date: 11/3/2022  Narrative: CHEST INDICATION:   sob  COMPARISON:  August 7, 2012  EXAM PERFORMED/VIEWS:  XR CHEST PA & LATERAL FINDINGS: Cardiomediastinal silhouette appears unremarkable  The lungs are clear  No pneumothorax or pleural effusion  Osseous structures appear within normal limits for patient age  Impression: No acute cardiopulmonary disease   Workstation performed: [id number]

## 2022-11-07 ENCOUNTER — APPOINTMENT (OUTPATIENT)
Dept: URGENT CARE | Facility: CLINIC | Age: 32
End: 2022-11-07

## 2022-11-11 ENCOUNTER — OFFICE VISIT (OUTPATIENT)
Dept: INTERNAL MEDICINE CLINIC | Facility: CLINIC | Age: 32
End: 2022-11-11

## 2022-11-11 VITALS
HEART RATE: 86 BPM | DIASTOLIC BLOOD PRESSURE: 72 MMHG | BODY MASS INDEX: 26.93 KG/M2 | OXYGEN SATURATION: 96 % | SYSTOLIC BLOOD PRESSURE: 104 MMHG | HEIGHT: 75 IN | WEIGHT: 216.6 LBS

## 2022-11-11 DIAGNOSIS — M54.42 ACUTE LEFT-SIDED LOW BACK PAIN WITH LEFT-SIDED SCIATICA: Primary | ICD-10-CM

## 2022-11-11 DIAGNOSIS — Z09 FOLLOW-UP EXAM: ICD-10-CM

## 2022-11-11 DIAGNOSIS — R13.19 ESOPHAGEAL DYSPHAGIA: ICD-10-CM

## 2022-11-11 DIAGNOSIS — F41.1 GAD (GENERALIZED ANXIETY DISORDER): ICD-10-CM

## 2022-11-11 DIAGNOSIS — T58.01XD: ICD-10-CM

## 2022-11-11 NOTE — PROGRESS NOTES
Assessment/Plan:    1  Acute left-sided low back pain with left-sided sciatica    2  Toxic effect of carbon monoxide from motor vehicle exhaust, unintentional, subsequent encounter  -     Carboxyhemoglobin; Future    3  Esophageal dysphagia    4  YOSI (generalized anxiety disorder)    5  Follow-up exam    The case discussed with patient using patient centered shared decision making  The patient was counseled regarding instructions for management,-- risk factor reductions,-- prognosis,-- impressions,-- risks and benefits of treatment options,-- importance of compliance with treatment  I have reviewed the instructions with the patient, answering all questions to his satisfaction  Exam normal  Suspect left buttock pain ->nerve impingement from lumbar ddd  Recommend supportive care(stretching, NSAIDs, heat)  Follow up if not better one week    Will follow along with GI    Recheck carbon monoxide level after working full week to ascertain correction of situation    rto 6 weeks        There are no Patient Instructions on file for this visit  Return in about 6 weeks (around 12/23/2022)  I have spent 30 minutes with Patient  today in which greater than 50% of this time was spent in counseling/coordination of care regarding Diagnostic results, Prognosis, Risks and benefits of tx options, Intructions for management, Patient and family education, Importance of tx compliance, Risk factor reductions and Impressions  Subjective:      Patient ID: Vahid Vazquez is a 34 y o  male  Chief Complaint   Patient presents with   • Pain     Stabbing pain under right buttocks with certain positions       33 yo presents for multi issues    1  Was seen at [place] [address] ED 11/3/22 for eval of acute shortness of breath, headache, dizziness, tachycardia while working  He was found to have 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 which he has been using for 2 months   Patient was struggling with the aforementioned symptoms for several months  Same symptoms resolved once company remedied the environment    2  Ongoing dysphagia for 4 months  Has seen GI who suspects esophagitis vs stricture  He will be scheduled for EGD once pulm clears him post carbon monoxide exposure    3  New left buttock pain for several days  Has history of low back pain  He has not treated s/s thus far      The following portions of the patient's history were reviewed and updated as appropriate: allergies, current medications, past family history, past medical history, past social history, past surgical history and problem list     Review of Systems   Constitutional: Positive for fatigue  Negative for fever and unexpected weight change  HENT: Positive for trouble swallowing  Respiratory: Negative  Cardiovascular: Negative  Gastrointestinal: Negative  Musculoskeletal: Positive for arthralgias, back pain and myalgias  Neurological: Negative  Psychiatric/Behavioral:        Anxiety improving         Current Outpatient Medications   Medication Sig Dispense Refill   • Multiple Vitamin (MULTIVITAMIN ADULT PO) Take by mouth     • pantoprazole (PROTONIX) 40 mg tablet Take 1 tablet (40 mg total) by mouth daily (Patient not taking: No sig reported) 30 tablet 0     No current facility-administered medications for this visit  Objective:    /72   Pulse 86   Ht 6' 3" (1 905 m)   Wt 98 2 kg (216 lb 9 6 oz)   SpO2 96%   BMI 27 07 kg/m²        Physical Exam  Vitals and nursing note reviewed  Constitutional:       General: He is not in acute distress  Appearance: Normal appearance  He is not ill-appearing  Eyes:      Pupils: Pupils are equal, round, and reactive to light  Cardiovascular:      Rate and Rhythm: Normal rate and regular rhythm  Pulses: Normal pulses  Heart sounds: Normal heart sounds  Pulmonary:      Effort: Pulmonary effort is normal       Breath sounds: Normal breath sounds     Musculoskeletal: Lumbar back: Normal       Left upper leg: Normal    Skin:     General: Skin is warm and dry  Findings: No erythema or rash  Neurological:      General: No focal deficit present  Mental Status: He is alert  Mental status is at baseline     Psychiatric:         Mood and Affect: Mood normal          Behavior: Behavior normal                 PRASHANT Lovett

## 2022-11-17 ENCOUNTER — CONSULT (OUTPATIENT)
Dept: PULMONOLOGY | Facility: CLINIC | Age: 32
End: 2022-11-17

## 2022-11-17 VITALS
WEIGHT: 223 LBS | BODY MASS INDEX: 27.73 KG/M2 | DIASTOLIC BLOOD PRESSURE: 64 MMHG | TEMPERATURE: 98 F | HEIGHT: 75 IN | HEART RATE: 69 BPM | SYSTOLIC BLOOD PRESSURE: 112 MMHG | OXYGEN SATURATION: 97 %

## 2022-11-17 DIAGNOSIS — Z01.818 PREOPERATIVE CLEARANCE: ICD-10-CM

## 2022-11-17 DIAGNOSIS — T58.91XD TOXIC EFFECT OF CARBON MONOXIDE, UNINTENTIONAL, SUBSEQUENT ENCOUNTER: ICD-10-CM

## 2022-11-17 DIAGNOSIS — R06.02 SHORTNESS OF BREATH: Primary | ICD-10-CM

## 2022-11-17 NOTE — ASSESSMENT & PLAN NOTE
He was seen in the emergency room with shortness of breath facial flushing headache and was found to have increased carboxyhemoglobin 3 percent  Currently his symptoms are much improved  His chest auscultation was clear  I reviewed his chest x-ray which was unremarkable  We will get a full PFT  his office spirogram was unremarkable except for mild restriction  He feels that his carbon monoxide inhalation was from his workplace which had no ventilation before  He operates a [employer]  Stated that after he complained the workplace environment has improved  Currently there is better ventilation  He is not using any inhaler or oxygen this time  I reassured him and answered all his questions

## 2022-11-17 NOTE — ASSESSMENT & PLAN NOTE
He is suspected to have eosinophilic esophagitis and is awaiting bronchoscopy by Dr Alec Taylor  He he is not at any increased risk for with complications because of his pulmonary issues  His pulmonary issues have improved remarkably  His office spirogram showed only mild restriction  Close monitoring is however advised during the procedure and perioperative bronchodilator therapy if needed his also recommended

## 2022-11-17 NOTE — PROGRESS NOTES
Assessment/Plan:    Shortness of breath  He was seen in the emergency room with shortness of breath facial flushing headache and was found to have increased carboxyhemoglobin 3 percent  Currently his symptoms are much improved  His chest auscultation was clear  I reviewed his chest x-ray which was unremarkable  We will get a full PFT  his office spirogram was unremarkable except for mild restriction  He feels that his carbon monoxide inhalation was from his workplace which had no ventilation before  He operates a [employer]  Stated that after he complained the workplace environment has improved  Currently there is better ventilation  He is not using any inhaler or oxygen this time  I reassured him and answered all his questions  Preoperative clearance  He is suspected to have eosinophilic esophagitis and is awaiting bronchoscopy by Dr Melody Díaz  He he is not at any increased risk for with complications because of his pulmonary issues  His pulmonary issues have improved remarkably  His office spirogram showed only mild restriction  Close monitoring is however advised during the procedure and perioperative bronchodilator therapy if needed his also recommended  Diagnoses and all orders for this visit:    Shortness of breath  -     Ambulatory Referral to Pulmonology  -     POCT spirometry  -     Complete PFT with post bronchodilator; Future    Toxic effect of carbon monoxide, unintentional, subsequent encounter  -     Ambulatory Referral to Pulmonology    Preoperative clearance          Subjective:      Patient ID: Rosamaria Quan is a 34 y o  male  Nic Restrepo was referred by his gastroenterologist Dr Melody Díaz for preoperative clearance prior to upper GI endoscopy for esophagitis  He had presented to the emergency room on 11/03/2022 with shortness of breath headache and facial flushing    He has been having symptoms for at least 2 months and it had gotten worse over the previous 2 weeks  He had a chest x-ray in the ER which was unremarkable  His carboxyhemoglobin was found to be 3 higher than the higher limit of 1 5  He has no history of asthma  Currently his shortness of breath is much better and he does not have any headache or facial flushing  Carbon monoxide inhalation was suspected as the cause for his symptoms  He spoke to the employer and subsequently the ventilation has been improved and the working and no arm and has been modified  He has no cough or phlegm or wheeze or chest pain  He has history of vaping with nicotine  He used to use marijuana before which he has not done for some time  He denied any swelling of feet chest pain or palpitations  No fever or chills  The following portions of the patient's history were reviewed and updated as appropriate: allergies, current medications, past family history, past medical history, past social history, past surgical history and problem list     Review of Systems   Constitutional: Positive for fatigue  Negative for appetite change, chills, fever and unexpected weight change  HENT: Positive for rhinorrhea and trouble swallowing  Negative for hearing loss, sneezing, sore throat and voice change  Eyes: Negative for visual disturbance  Respiratory: Positive for shortness of breath  Negative for cough, chest tightness and wheezing  Cardiovascular: Negative for chest pain, palpitations and leg swelling  Gastrointestinal: Positive for constipation  Negative for abdominal pain, diarrhea and nausea  Genitourinary: Negative for dysuria, frequency and urgency  Musculoskeletal: Negative for arthralgias, gait problem and joint swelling  Skin: Negative for rash  Flushing of face   Allergic/Immunologic: Negative for environmental allergies  Neurological: Negative for dizziness, syncope, light-headedness and headaches  Psychiatric/Behavioral: Negative for agitation, confusion and sleep disturbance   The patient is not nervous/anxious  Objective:      /64   Pulse 69   Temp 98 °F (36 7 °C)   Ht 6' 3" (1 905 m)   Wt 101 kg (223 lb)   SpO2 97%   BMI 27 87 kg/m²          Physical Exam  Vitals reviewed  Constitutional:       General: He is not in acute distress  Appearance: He is not ill-appearing, toxic-appearing or diaphoretic  HENT:      Head: Normocephalic  Mouth/Throat:      Mouth: Mucous membranes are moist       Pharynx: Oropharynx is clear  Eyes:      General: No scleral icterus  Conjunctiva/sclera: Conjunctivae normal    Cardiovascular:      Rate and Rhythm: Normal rate and regular rhythm  Heart sounds: Normal heart sounds  No murmur heard  Pulmonary:      Effort: Pulmonary effort is normal  No respiratory distress  Breath sounds: Normal breath sounds  No stridor  No wheezing, rhonchi or rales  Chest:      Chest wall: No tenderness  Abdominal:      General: Bowel sounds are normal       Palpations: Abdomen is soft  Tenderness: There is no abdominal tenderness  There is no guarding  Musculoskeletal:      Cervical back: No rigidity  Right lower leg: No edema  Left lower leg: No edema  Lymphadenopathy:      Cervical: No cervical adenopathy  Skin:     Coloration: Skin is not jaundiced or pale  Findings: No rash  Neurological:      Mental Status: He is alert and oriented to person, place, and time  Gait: Gait normal    Psychiatric:         Mood and Affect: Mood normal          Behavior: Behavior normal          Thought Content:  Thought content normal          Judgment: Judgment normal

## 2022-11-18 ENCOUNTER — PREP FOR PROCEDURE (OUTPATIENT)
Dept: GASTROENTEROLOGY | Facility: CLINIC | Age: 32
End: 2022-11-18

## 2022-11-18 ENCOUNTER — TELEPHONE (OUTPATIENT)
Dept: GASTROENTEROLOGY | Facility: CLINIC | Age: 32
End: 2022-11-18

## 2022-11-18 DIAGNOSIS — R13.10 DYSPHAGIA, UNSPECIFIED TYPE: Primary | ICD-10-CM

## 2022-11-18 NOTE — TELEPHONE ENCOUNTER
Scheduled date of EGD(as of today):1/30/23  Physician performing EGD: Dr Marshall Prime  Location of EGD:[facility]  Clearances: NA-pt stated he has been cleared by pulmonary

## 2022-11-21 ENCOUNTER — HOSPITAL ENCOUNTER (OUTPATIENT)
Dept: PULMONOLOGY | Facility: HOSPITAL | Age: 32
Discharge: HOME | End: 2022-11-21
Attending: INTERNAL MEDICINE

## 2022-11-21 DIAGNOSIS — R06.02 SHORTNESS OF BREATH: ICD-10-CM

## 2022-11-21 RX ORDER — ALBUTEROL SULFATE 2.5 MG/3ML
2.5 SOLUTION RESPIRATORY (INHALATION) ONCE
Status: COMPLETED | OUTPATIENT
Start: 2022-11-21 | End: 2022-11-21

## 2022-11-21 RX ADMIN — ALBUTEROL SULFATE 2.5 MG: 2.5 SOLUTION RESPIRATORY (INHALATION) at 07:40

## 2022-11-22 ENCOUNTER — APPOINTMENT (OUTPATIENT)
Dept: URGENT CARE | Age: 32
End: 2022-11-22

## 2022-12-15 ENCOUNTER — APPOINTMENT (OUTPATIENT)
Dept: URGENT CARE | Age: 32
End: 2022-12-15

## 2022-12-23 ENCOUNTER — TELEPHONE (OUTPATIENT)
Dept: ADMINISTRATIVE | Facility: HOSPITAL | Age: 32
End: 2022-12-23

## 2022-12-23 ENCOUNTER — TELEPHONE (OUTPATIENT)
Dept: GASTROENTEROLOGY | Facility: CLINIC | Age: 32
End: 2022-12-23

## 2022-12-23 NOTE — TELEPHONE ENCOUNTER
Scheduled date of EGD(as of today): 1/04/2023    Physician performing EGD: Dr Kelly Boone    Location of EGD: [facility]    Clearances: N/A

## 2022-12-23 NOTE — TELEPHONE ENCOUNTER
Pt rescheduled EGD with Dr Princess Langford at [facility] 1/04/2023

## 2022-12-23 NOTE — TELEPHONE ENCOUNTER
I lmom for pt to please call back to reschedule his procedure on 1/11/23 due to insurance being [facility] at [facility] and needs to be rescheduled to another location  Will call pt again in one week if do not hear back from him

## 2023-01-04 ENCOUNTER — HOSPITAL ENCOUNTER (OUTPATIENT)
Dept: GASTROENTEROLOGY | Facility: HOSPITAL | Age: 33
Setting detail: OUTPATIENT SURGERY
Discharge: HOME | End: 2023-01-04
Attending: INTERNAL MEDICINE

## 2023-01-04 ENCOUNTER — ANESTHESIA EVENT (OUTPATIENT)
Dept: GASTROENTEROLOGY | Facility: HOSPITAL | Age: 33
End: 2023-01-04

## 2023-01-04 ENCOUNTER — ANESTHESIA (OUTPATIENT)
Dept: GASTROENTEROLOGY | Facility: HOSPITAL | Age: 33
End: 2023-01-04

## 2023-01-04 VITALS
HEART RATE: 76 BPM | RESPIRATION RATE: 14 BRPM | DIASTOLIC BLOOD PRESSURE: 60 MMHG | TEMPERATURE: 98 F | OXYGEN SATURATION: 97 % | SYSTOLIC BLOOD PRESSURE: 110 MMHG

## 2023-01-04 DIAGNOSIS — R13.10 DYSPHAGIA, UNSPECIFIED TYPE: ICD-10-CM

## 2023-01-04 DIAGNOSIS — B37.81 CANDIDA ESOPHAGITIS (HCC): Primary | ICD-10-CM

## 2023-01-04 RX ORDER — SODIUM CHLORIDE, SODIUM LACTATE, POTASSIUM CHLORIDE, CALCIUM CHLORIDE 600; 310; 30; 20 MG/100ML; MG/100ML; MG/100ML; MG/100ML
125 INJECTION, SOLUTION INTRAVENOUS CONTINUOUS
Status: DISCONTINUED | OUTPATIENT
Start: 2023-01-04 | End: 2023-01-08 | Stop reason: HOSPADM

## 2023-01-04 RX ORDER — ONDANSETRON 2 MG/ML
4 INJECTION INTRAMUSCULAR; INTRAVENOUS ONCE AS NEEDED
Status: CANCELLED | OUTPATIENT
Start: 2023-01-04

## 2023-01-04 RX ORDER — DIPHENHYDRAMINE HYDROCHLORIDE 50 MG/ML
12.5 INJECTION INTRAMUSCULAR; INTRAVENOUS ONCE AS NEEDED
Status: CANCELLED | OUTPATIENT
Start: 2023-01-04

## 2023-01-04 RX ORDER — PROPOFOL 10 MG/ML
INJECTION, EMULSION INTRAVENOUS AS NEEDED
Status: DISCONTINUED | OUTPATIENT
Start: 2023-01-04 | End: 2023-01-04

## 2023-01-04 RX ORDER — SODIUM CHLORIDE, SODIUM LACTATE, POTASSIUM CHLORIDE, CALCIUM CHLORIDE 600; 310; 30; 20 MG/100ML; MG/100ML; MG/100ML; MG/100ML
INJECTION, SOLUTION INTRAVENOUS CONTINUOUS PRN
Status: DISCONTINUED | OUTPATIENT
Start: 2023-01-04 | End: 2023-01-04

## 2023-01-04 RX ORDER — LIDOCAINE HYDROCHLORIDE 10 MG/ML
0.5 INJECTION, SOLUTION EPIDURAL; INFILTRATION; INTRACAUDAL; PERINEURAL ONCE AS NEEDED
Status: DISCONTINUED | OUTPATIENT
Start: 2023-01-04 | End: 2023-01-08 | Stop reason: HOSPADM

## 2023-01-04 RX ORDER — LIDOCAINE HYDROCHLORIDE 20 MG/ML
INJECTION, SOLUTION EPIDURAL; INFILTRATION; INTRACAUDAL; PERINEURAL AS NEEDED
Status: DISCONTINUED | OUTPATIENT
Start: 2023-01-04 | End: 2023-01-04

## 2023-01-04 RX ORDER — METOCLOPRAMIDE HYDROCHLORIDE 5 MG/ML
10 INJECTION INTRAMUSCULAR; INTRAVENOUS ONCE AS NEEDED
Status: CANCELLED | OUTPATIENT
Start: 2023-01-04

## 2023-01-04 RX ADMIN — PROPOFOL 100 MG: 10 INJECTION, EMULSION INTRAVENOUS at 07:37

## 2023-01-04 RX ADMIN — LIDOCAINE HYDROCHLORIDE 100 MG: 20 INJECTION, SOLUTION EPIDURAL; INFILTRATION; INTRACAUDAL; PERINEURAL at 07:31

## 2023-01-04 RX ADMIN — PROPOFOL 100 MG: 10 INJECTION, EMULSION INTRAVENOUS at 07:34

## 2023-01-04 RX ADMIN — PROPOFOL 300 MG: 10 INJECTION, EMULSION INTRAVENOUS at 07:31

## 2023-01-04 RX ADMIN — SODIUM CHLORIDE, SODIUM LACTATE, POTASSIUM CHLORIDE, AND CALCIUM CHLORIDE: .6; .31; .03; .02 INJECTION, SOLUTION INTRAVENOUS at 07:28

## 2023-01-04 NOTE — H&P
History and Physical - [facility]  Lucy Ahumada 34 y o  male MRN: [id number]                  HPI: Lucy Ahumada is a 34y o  year old male who presents for upper endoscopy  History of reflux and dysphagia  REVIEW OF SYSTEMS: Per the HPI, and otherwise unremarkable  Historical Information   History reviewed  No pertinent past medical history  Past Surgical History:   Procedure Laterality Date   • WISDOM TOOTH EXTRACTION       Social History   Social History     Substance and Sexual Activity   Alcohol Use Not Currently     Social History     Substance and Sexual Activity   Drug Use Not Currently   • Types: Marijuana    Comment: stopped October 3, 2022     Social History     Tobacco Use   Smoking Status Every Day   • Packs/day: 0 50   • Years: 18 00   • Pack years: 9 00   • Types: Cigarettes   • Start date:    • Last attempt to quit: 2022   • Years since quittin 1   Smokeless Tobacco Former   • Types: Snuff     Family History   Problem Relation Age of Onset   • Cancer Mother    • Breast cancer Mother    • No Known Problems Brother    • No Known Problems Brother        Meds/Allergies       Current Outpatient Medications:   •  ASHWAGANDHA PO  •  Multiple Vitamin (MULTIVITAMIN ADULT PO)  •  pantoprazole (PROTONIX) 40 mg tablet    Current Facility-Administered Medications:   •  lactated ringers infusion, 125 mL/hr, Intravenous, Continuous  •  lidocaine (PF) (XYLOCAINE-MPF) 1 % injection 0 5 mL, 0 5 mL, Infiltration, Once PRN    No Known Allergies    Objective     /74   Pulse 76   Temp 97 5 °F (36 4 °C) (Temporal)   Resp 15   SpO2 100%       PHYSICAL EXAM    Gen: NAD  Head: NCAT  CV: RRR  CHEST: Clear  ABD: soft, NT/ND  EXT: no edema      ASSESSMENT/PLAN:  This is a 34y o  year old male here for EGD, and he is stable and optimized for his procedure

## 2023-01-04 NOTE — DISCHARGE SUMMARY
Discharge Summary - Roberto Broderick 34 y o  male MRN: [id number]    Unit/Bed#:  Encounter: [id number]    Admission Date: 1/4/2023    Admitting Diagnosis: Dysphagia, unspecified type [R13 10]    HPI: Underwent upper endoscopy for evaluation of dysphagia    Procedures Performed: No orders of the defined types were placed in this encounter  Summary of Hospital Course: Tolerated procedure well    Significant Findings, Care, Treatment and Services Provided: Cheesy exudate noted in the upper esophagus which may indicate Candida esophagitis  Mild gastritis    Complications: None    Discharge Diagnosis: See above    Medical Problems     Resolved Problems  Date Reviewed: 1/4/2023   None         Condition at Discharge: good         Discharge instructions/Information to patient and family:   See after visit summary for information provided to patient and family  Provisions for Follow-Up Care:  See after visit summary for information related to follow-up care and any pertinent home health orders        PCP: PRASHANT Hoyt    Disposition: Home

## 2023-01-04 NOTE — INTERVAL H&P NOTE
H&P reviewed  After examining the patient I find no changes in the patients condition since the H&P had been written      Vitals:    01/04/23 0658   BP: 123/74   Pulse: 76   Resp: 15   Temp: 97 5 °F (36 4 °C)   SpO2: 100%

## 2023-01-04 NOTE — ANESTHESIA POSTPROCEDURE EVALUATION
Post-Op Assessment Note    CV Status:  Stable  Pain Score: 0    Pain management: adequate     Mental Status:  Arousable and sleepy   Hydration Status:  Stable   PONV Controlled:  Controlled   Airway Patency:  Patent      Post Op Vitals Reviewed: Yes      Staff: CRNA         No notable events documented      BP   94/54   Temp 97 8   Pulse 78   Resp 14   SpO2 99

## 2023-01-04 NOTE — ANESTHESIA PREPROCEDURE EVALUATION
Procedure:  EGD    Relevant Problems   ANESTHESIA (within normal limits)      CARDIO (within normal limits)      ENDO (within normal limits)      GI/HEPATIC (within normal limits)      /RENAL (within normal limits)      HEMATOLOGY (within normal limits)      MUSCULOSKELETAL (within normal limits)      NEURO/PSYCH (within normal limits)      PULMONARY   (+) Shortness of breath        Physical Exam    Airway    Mallampati score: II  TM Distance: >3 FB  Neck ROM: full     Dental   No notable dental hx     Cardiovascular  Rhythm: regular, Rate: normal, Cardiovascular exam normal    Pulmonary  Pulmonary exam normal Breath sounds clear to auscultation,     Other Findings        Anesthesia Plan  ASA Score- 1     Anesthesia Type- IV sedation with anesthesia with ASA Monitors  Additional Monitors:   Airway Plan:           Plan Factors-Exercise tolerance (METS): >4 METS  Chart reviewed  Existing labs reviewed  Patient summary reviewed  Induction- intravenous  Postoperative Plan-     Informed Consent- Anesthetic plan and risks discussed with patient  I personally reviewed this patient with the CRNA  Discussed and agreed on the Anesthesia Plan with the CRNA  Eduarda Kaba

## 2023-01-16 ENCOUNTER — PREP FOR PROCEDURE (OUTPATIENT)
Dept: GASTROENTEROLOGY | Facility: CLINIC | Age: 33
End: 2023-01-16

## 2023-01-16 ENCOUNTER — APPOINTMENT (OUTPATIENT)
Dept: URGENT CARE | Age: 33
End: 2023-01-16

## 2023-01-16 DIAGNOSIS — R13.19 ESOPHAGEAL DYSPHAGIA: Primary | ICD-10-CM

## 2023-08-16 ENCOUNTER — OFFICE VISIT (OUTPATIENT)
Dept: URGENT CARE | Facility: MEDICAL CENTER | Age: 33
End: 2023-08-16
Payer: COMMERCIAL

## 2023-08-16 VITALS
SYSTOLIC BLOOD PRESSURE: 122 MMHG | OXYGEN SATURATION: 98 % | HEART RATE: 74 BPM | RESPIRATION RATE: 18 BRPM | HEIGHT: 75 IN | DIASTOLIC BLOOD PRESSURE: 80 MMHG | TEMPERATURE: 97.3 F | BODY MASS INDEX: 26.98 KG/M2 | WEIGHT: 217 LBS

## 2023-08-16 DIAGNOSIS — R50.9 FEVER, UNSPECIFIED FEVER CAUSE: Primary | ICD-10-CM

## 2023-08-16 PROCEDURE — 87636 SARSCOV2 & INF A&B AMP PRB: CPT | Performed by: PHYSICIAN ASSISTANT

## 2023-08-16 PROCEDURE — 99213 OFFICE O/P EST LOW 20 MIN: CPT | Performed by: PHYSICIAN ASSISTANT

## 2023-08-16 NOTE — PROGRESS NOTES
[facility]        NAME: Ronan Loredo is a 28 y.o. male  : 1990    MRN: [id number]  DATE: 2023  TIME: 3:58 PM    Assessment and Plan   Fever, unspecified fever cause [R50.9]  1. Fever, unspecified fever cause  Covid/Flu-Office Collect            Patient Instructions     Fever  Over-the-counter pain medication for fever/pain relief  Follow-up with primary care doctor  Follow up with PCP in 3-5 days. Proceed to  ER if symptoms worsen. Chief Complaint     Chief Complaint   Patient presents with   • Fever     Pt. With fever for the past 2 days. T-max 102.7         History of Present Illness       56-year-old male who presents complaining of fevers, headaches x3 days. Patient states that Tmax has been 102. Denies cough, congestion, runny nose, abdominal pain, sore throat. States he did COVID-19 test at home which was negative, photophobia. Fever  Associated symptoms include chills, a fever and headaches. Pertinent negatives include no chest pain or coughing. Review of Systems   Review of Systems   Constitutional: Positive for chills and fever. HENT: Negative. Eyes: Negative. Respiratory: Negative. Negative for apnea, cough, choking, chest tightness, shortness of breath, wheezing and stridor. Cardiovascular: Negative. Negative for chest pain. Neurological: Positive for headaches.          Current Medications       Current Outpatient Medications:   •  ASHWAGANDHA PO, Take by mouth (Patient not taking: Reported on 2023), Disp: , Rfl:   •  Multiple Vitamin (MULTIVITAMIN ADULT PO), Take by mouth (Patient not taking: Reported on 2023), Disp: , Rfl:   •  pantoprazole (PROTONIX) 40 mg tablet, Take 1 tablet (40 mg total) by mouth daily (Patient not taking: Reported on 10/27/2022), Disp: 30 tablet, Rfl: 0    Current Allergies     Allergies as of 2023   • (No Known Allergies)            The following portions of the patient's history were reviewed and updated as appropriate: allergies, current medications, past family history, past medical history, past social history, past surgical history and problem list.     No past medical history on file. Past Surgical History:   Procedure Laterality Date   • WISDOM TOOTH EXTRACTION         Family History   Problem Relation Age of Onset   • Cancer Mother    • Breast cancer Mother    • No Known Problems Brother    • No Known Problems Brother          Medications have been verified. Objective   /80   Pulse 74   Temp (!) 97.3 °F (36.3 °C)   Resp 18   Ht 6' 3" (1.905 m)   Wt 98.4 kg (217 lb)   SpO2 98%   BMI 27.12 kg/m²        Physical Exam     Physical Exam  Constitutional:       General: He is not in acute distress. Appearance: Normal appearance. He is well-developed. He is not diaphoretic. HENT:      Head: Normocephalic and atraumatic. Right Ear: Tympanic membrane, ear canal and external ear normal. There is no impacted cerumen. Left Ear: Tympanic membrane, ear canal and external ear normal. There is no impacted cerumen. Mouth/Throat:      Mouth: Mucous membranes are moist.   Eyes:      Extraocular Movements: Extraocular movements intact. Conjunctiva/sclera: Conjunctivae normal.      Pupils: Pupils are equal, round, and reactive to light. Cardiovascular:      Rate and Rhythm: Normal rate and regular rhythm. Heart sounds: Normal heart sounds. Pulmonary:      Effort: Pulmonary effort is normal. No respiratory distress. Breath sounds: Normal breath sounds. No stridor. No wheezing, rhonchi or rales. Chest:      Chest wall: No tenderness. Musculoskeletal:      Cervical back: Normal range of motion and neck supple. Lymphadenopathy:      Cervical: No cervical adenopathy. Neurological:      Mental Status: He is alert.

## 2023-08-16 NOTE — PATIENT INSTRUCTIONS
Fever  Over-the-counter pain medication for fever/pain relief  Follow-up with primary care doctor  Follow up with PCP in 3-5 days. Proceed to  ER if symptoms worsen.

## 2023-08-16 NOTE — LETTER
August 16, 2023     Patient: Harvinder Bellamy   YOB: 1990   Date of Visit: 8/16/2023       To Whom it May Concern:    Harvinder Bellamy was seen in my clinic on 8/16/2023. He may return to work when he is fever free x24 hours without fever reducing medication. If you have any questions or concerns, please don't hesitate to call.          Sincerely,          [facility]        CC: No Recipients

## 2023-08-17 LAB
FLUAV RNA RESP QL NAA+PROBE: NEGATIVE
FLUBV RNA RESP QL NAA+PROBE: NEGATIVE
SARS-COV-2 RNA RESP QL NAA+PROBE: NEGATIVE

## 2023-11-30 ENCOUNTER — OFFICE VISIT (OUTPATIENT)
Dept: FAMILY MEDICINE CLINIC | Facility: CLINIC | Age: 33
End: 2023-11-30
Payer: COMMERCIAL

## 2023-11-30 VITALS
HEIGHT: 75 IN | OXYGEN SATURATION: 99 % | WEIGHT: 233 LBS | RESPIRATION RATE: 16 BRPM | HEART RATE: 80 BPM | DIASTOLIC BLOOD PRESSURE: 66 MMHG | BODY MASS INDEX: 28.97 KG/M2 | TEMPERATURE: 97.2 F | SYSTOLIC BLOOD PRESSURE: 112 MMHG

## 2023-11-30 DIAGNOSIS — R53.83 FATIGUE, UNSPECIFIED TYPE: ICD-10-CM

## 2023-11-30 DIAGNOSIS — Z71.2 ENCOUNTER TO DISCUSS TEST RESULTS: ICD-10-CM

## 2023-11-30 DIAGNOSIS — Z79.899 MEDICATION MANAGEMENT: ICD-10-CM

## 2023-11-30 DIAGNOSIS — E55.9 VITAMIN D INSUFFICIENCY: ICD-10-CM

## 2023-11-30 DIAGNOSIS — R13.12 OROPHARYNGEAL DYSPHAGIA: Primary | ICD-10-CM

## 2023-11-30 DIAGNOSIS — Z76.89 ENCOUNTER TO ESTABLISH CARE WITH NEW DOCTOR: ICD-10-CM

## 2023-11-30 DIAGNOSIS — Z00.00 WELLNESS EXAMINATION: ICD-10-CM

## 2023-11-30 DIAGNOSIS — Z79.899 ENCOUNTER FOR LONG-TERM (CURRENT) USE OF MEDICATIONS: ICD-10-CM

## 2023-11-30 PROCEDURE — 99395 PREV VISIT EST AGE 18-39: CPT | Performed by: FAMILY MEDICINE

## 2023-11-30 PROCEDURE — 99203 OFFICE O/P NEW LOW 30 MIN: CPT | Performed by: FAMILY MEDICINE

## 2023-11-30 RX ORDER — CLOTRIMAZOLE 10 MG/1
10 LOZENGE ORAL; TOPICAL
Qty: 35 TROCHE | Refills: 1 | Status: SHIPPED | OUTPATIENT
Start: 2023-11-30

## 2023-11-30 NOTE — ASSESSMENT & PLAN NOTE
This is the most important feature today. This has been going on for more than a year. See comments above. We will begin immediate treatment with Mycelex daryl. Follow up with fluoroscopy and EGD if that doesn't totally clear this up. Discontinue Protonix.

## 2023-11-30 NOTE — ASSESSMENT & PLAN NOTE
The patient is taking just vitamins and ashwagandha. Will begin on Mycelex daryl. Instructed to dissolve slowly in the mouth and swallow 5 times a day for 7 days. Instructed not to eat or drink anything within 30 minutes of taking it, and make sure to drink water, before taking it.

## 2023-11-30 NOTE — PROGRESS NOTES
Assessment/Plan:          1. Oropharyngeal dysphagia  Assessment & Plan: This is the most important feature today. This has been going on for more than a year. See comments above. We will begin immediate treatment with Mycelex daryl. Follow up with fluoroscopy and EGD if that doesn't totally clear this up. Discontinue Protonix. Orders:  -     clotrimazole (MYCELEX) 10 mg daryl; Take 1 tablet (10 mg total) by mouth 5 (five) times a day    2. Encounter to establish care with new doctor  Assessment & Plan:  First visit to me. Orders:  -     CBC; Future  -     UA w Reflex to Microscopic w Reflex to Culture  -     Comprehensive metabolic panel; Future  -     Lipid panel; Future  -     TSH, 3rd generation; Future  -     Vitamin D 25 hydroxy; Future  -     Albumin / creatinine urine ratio  -     Ambulatory referral to Gastroenterology; Future; Expected date: 12/14/2023  -     FL barium swallow ROUTINE esophagus; Future; Expected date: 12/07/2023    3. Encounter to discuss test results  Assessment & Plan:  Reviewed EGD and GI consults from 1 year ago in detail. 4. Medication management  Assessment & Plan:  The patient is taking just vitamins and ashwagandha. Will begin on Mycelex daryl. Instructed to dissolve slowly in the mouth and swallow 5 times a day for 7 days. Instructed not to eat or drink anything within 30 minutes of taking it, and make sure to drink water, before taking it. 5. Encounter for long-term (current) use of medications  Assessment & Plan:  Discussed health vitamins. 6. Vitamin D insufficiency  Assessment & Plan: Will check the level. He is undoubtedly insufficient vitamin D, but I need to know the exact number. Orders:  -     Vitamin D 25 hydroxy; Future    7. Fatigue, unspecified type  Assessment & Plan:  The patient does not get enough rest per his own description. Orders:  -     CBC;  Future  -     UA w Reflex to Microscopic w Reflex to Culture  -     Comprehensive metabolic panel; Future  -     Lipid panel; Future  -     TSH, 3rd generation; Future  -     Vitamin D 25 hydroxy; Future  -     Albumin / creatinine urine ratio  -     Ambulatory referral to Gastroenterology; Future; Expected date: 12/14/2023  -     FL barium swallow ROUTINE esophagus; Future; Expected date: 12/07/2023    8. Wellness examination  Assessment & Plan:  Performed today. Counseling. Patient is a current smoker user. Tobacco cessation intervention, counseling and or pharmacotherapy are required. BMI Counseling: Body mass index is 29.12 kg/m². The BMI is above normal. Nutrition recommendations include decreasing portion sizes, encouraging healthy choices of fruits and vegetables, decreasing fast food intake, consuming healthier snacks, limiting drinks that contain sugar, moderation in carbohydrate intake, increasing intake of lean protein and reducing intake of saturated and trans fat. Exercise recommendations include moderate physical activity 150 minutes/week and exercising 3-5 times per week. No pharmacotherapy was ordered. Rationale for BMI follow-up plan is due to patient being overweight or obese. Depression Screening and Follow-up Plan: Patient was screened for depression during today's encounter. They screened negative with a PHQ-2 score of 0. Subjective:       Patient ID: Celine Edmond is a 28 y.o. male new patient to me who presents for an annual physical.    Patient here for a comprehensive physical exam.    Diet and Physical Activity   - Diet/Nutrition: He follows a balanced diet. Limited fruits and vegetables. He eats sushi, dahl, and eggs. - Exercise: He goes to the gym and does moderate cardiovascular and strength training 3 to 4 times a week. General Health   - Sleep: He sleeps for 4 to 6 hours. He sleeps well on the weekends but not enough. - Hearing: Normal.   - Vision: He denies any vision problems. He does not see an eye doctor.  He does not wear glasses or contacts. 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   - Dental: He sees a dentist once or twice a year. He brushes his teeth twice a day. He flosses occasionally. Patient having more than 1 year difficulty swallowing. He was scoped a year ago by Dr. Esther Ferro and felt to have candida esophagitis but biopsies negative. He still has difficulty swallowing but he is doing a lot better. Back then, he was unable to eat anything at all. He would choke on applesauce and water. He first noticed this in 08/2022 when he ate something, and he started choking on it. Now, he can eat what he wants to eat. He cannot eat bread, cheeseburgers, or pizza because he cannot swallow it. He cannot eat steak because it gets stuck in his esophagus. He was informed he had acid reflux. He went to urgent care in 09/2022 because he could not swallow. After 3 weeks of not eating, he started to have abdominal pain and he still could not swallow. He thought he just had a sore throat. He went to the ER in [facility]. He came back 4 days later because he called 911 because he thought he was having a heart attack. He started taking Protonix as prescribed, but it made him feel worse. He thinks he was allergic to something in it as soon he started it. He never had any problems with swallowing or breathing before last year. He suspected carbon monoxide poisoning because he worked around a [email or web address] in a closed room. He obtained blood test from [facility] and his carbon monoxide was elevated, and they sent him to a company doctor. The company doctor informed him that it was not carbon monoxide poisoning. He changed his work and they put on the air systems, and he noticed improvement. He reports his throat was mildly irritated the other day. He sometimes bites his tongue and chews gum a lot. He denies any feeling down, depressed, or hopeless. He also denies any weakness in his arm muscles or any trouble walking or going up steps. Recently, he had no issues with any food getting stuck in his esophagus. He stopped eating things that would cause it. Mostly eats sushi. 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 He denies fatigue during the day. His knees crack occasionally. He climbs around a lot at work. He does not kneel on anything. He was having trouble breathing but denies any trouble breathing recently. He denies using bronchodilator inhaler. He has levoscoliosis but has no suspicious bone lesions. He smokes half a pack a day. He is not ready to quit. He has not had any blood tests since the ER visit. He had scope biopsy last 01/12/2022 which does not show candida infection. There was also no evidence of eosinophilic esophagitis. Noted in the proximal esophagus, questionable candida esophagitis biopsies taken. Mild patchy erythematous. He completed EGD in 11/18/2022 and then has another one ordered in 01/2023. It showed abnormal mucosa with exudate and plaque in the upper third of the esophagus. His chest x-ray from 2022 showed no disease, his lungs were clear, and there were no significant findings. He also had a CT scan of the soft tissues of the neck that revealed mildly enlarged tonsils. No mass, abscess, or secondary signs of inflammation, but no suspicious lymphadenopathy. The following portions of the patient's history were reviewed and updated as appropriate: allergies, current medications, past family history, past medical history, past social history, past surgical history, and problem list.              Objective:       /66 (BP Location: Left arm, Patient Position: Sitting, Cuff Size: Large)   Pulse 80   Temp (!) 97.2 °F (36.2 °C) (Temporal)   Resp 16   Ht 6' 3" (1.905 m)   Wt 106 kg (233 lb)   SpO2 99%   BMI 29.12 kg/m²          Physical Exam  Vitals and nursing note reviewed. BP: 112/66 mmHg. Constitutional:       General: He is not in acute distress. Appearance: Normal appearance.  He is well-developed. HENT: Unremarkable. Head: Normocephalic and atraumatic. Eyes:      General:         Right eye: No discharge. Left eye: No discharge. Ears: Normal. No excess wax. Neck:      Thyroid: No thyromegaly. Throat: Slightly erythematous. Negative candida or any fungal invasion to the oral cavity_  Cardiovascular:      Rate and Rhythm: Normal rate and regular rhythm without murmur. Pulses: Normal pulses. Heart sounds: Normal heart sounds. No murmur heard. Pulmonary: Chest clear bilaterally. Effort: Pulmonary effort is normal.      Breath sounds: Normal breath sounds. No rales, wheezing, or rhonchi. Musculoskeletal:      Cervical back: Neck supple. Right lower leg: No edema. Left lower leg: No edema. Lymphadenopathy:      Cervical: No cervical adenopathy. Skin:     General: Skin is warm. Capillary Refill: Capillary refill takes less than 2 seconds. Neurological:      General: No focal deficit present. Mental Status: He is alert and oriented to person, place, and time. Psychiatric:         Mood and Affect: Mood normal.         Behavior: Behavior normal.         Thought Content: Thought content normal.            I personally reviewed the recent (and prior)  lab results, the image studies, pathology, other specialty/physicians consult notes and recommendations, and outside medical records from other institutions, as appropriate. I had a lengthy discussion with the patient and shared the work-up findings. We discussed the diagnosis and management plan. I spent  60  minutes reviewing the records (labs, clinician notes, outside records, medical history, ordering medicine/tests/procedures, interpreting the imaging/labs previously done) and coordination of care as well as direct time with the patient today, of which greater than 50% of the time was spent in counseling and coordination of care with the patient/family.     Transcribed for Liban Kenney DO, by Main Bermudez on 12/02/23 at 1:14 PM. Powered by [employer].

## 2023-11-30 NOTE — PROGRESS NOTES
[facility]    NAME: Fox Guerra  AGE: 28 y.o. SEX: male  : 1990     DATE: 2023     Assessment and Plan:     1. Oropharyngeal dysphagia  Assessment & Plan: This is the most important feature today. This has been going on for more than a year. See comments above. We will begin immediate treatment with Mycelex daryl. Follow up with fluoroscopy and EGD if that doesn't totally clear this up. Discontinue Protonix. Orders:  -     clotrimazole (MYCELEX) 10 mg daryl; Take 1 tablet (10 mg total) by mouth 5 (five) times a day    2. Encounter to establish care with new doctor  Assessment & Plan:  First visit to me. Orders:  -     CBC; Future  -     UA w Reflex to Microscopic w Reflex to Culture  -     Comprehensive metabolic panel; Future  -     Lipid panel; Future  -     TSH, 3rd generation; Future  -     Vitamin D 25 hydroxy; Future  -     Albumin / creatinine urine ratio  -     Ambulatory referral to Gastroenterology; Future; Expected date: 2023  -     FL barium swallow ROUTINE esophagus; Future; Expected date: 2023    3. Encounter to discuss test results  Assessment & Plan:  Reviewed EGD and GI consults from 1 year ago in detail. 4. Medication management  Assessment & Plan:  The patient is taking just vitamins and ashwagandha. Will begin on Mycelex daryl. Instructed to dissolve slowly in the mouth and swallow 5 times a day for 7 days. Instructed not to eat or drink anything within 30 minutes of taking it, and make sure to drink water, before taking it. 5. Encounter for long-term (current) use of medications  Assessment & Plan:  Discussed health vitamins. 6. Vitamin D insufficiency  Assessment & Plan: Will check the level. He is undoubtedly insufficient vitamin D, but I need to know the exact number. Orders:  -     Vitamin D 25 hydroxy; Future    7.  Fatigue, unspecified type  Assessment & Plan:  The patient does not get enough rest per his own description. Orders:  -     CBC; Future  -     UA w Reflex to Microscopic w Reflex to Culture  -     Comprehensive metabolic panel; Future  -     Lipid panel; Future  -     TSH, 3rd generation; Future  -     Vitamin D 25 hydroxy; Future  -     Albumin / creatinine urine ratio  -     Ambulatory referral to Gastroenterology; Future; Expected date: 12/14/2023  -     FL barium swallow ROUTINE esophagus; Future; Expected date: 12/07/2023    8. Wellness examination  Assessment & Plan:  Performed today. Counseling:  Alcohol/drug use: discussed moderation in alcohol intake, the recommendations for healthy alcohol use, and avoidance of illicit drug use. Dental Health: discussed importance of regular tooth brushing, flossing, and dental visits. Injury prevention: discussed safety/seat belts, safety helmets, smoke detectors, carbon dioxide detectors, and smoking near bedding or upholstery. Sexual health: discussed sexually transmitted diseases, partner selection, use of condoms, avoidance of unintended pregnancy, and contraceptive alternatives. Exercise: the importance of regular exercise/physical activity was discussed. Recommend exercise 3-5 times per week for at least 30 minutes. BMI Counseling: Body mass index is 29.12 kg/m². The BMI is above normal. Nutrition recommendations include decreasing portion sizes, encouraging healthy choices of fruits and vegetables, decreasing fast food intake, consuming healthier snacks, limiting drinks that contain sugar, moderation in carbohydrate intake, increasing intake of lean protein and reducing intake of saturated and trans fat. Exercise recommendations include moderate physical activity 150 minutes/week and exercising 3-5 times per week. No pharmacotherapy was ordered. Rationale for BMI follow-up plan is due to patient being overweight or obese.      Depression Screening and Follow-up Plan: Patient was screened for depression during today's encounter. They screened negative with a PHQ-2 score of 0. Tobacco Cessation Counseling: Tobacco cessation counseling was provided. The patient is sincerely urged to quit consumption of tobacco. He is not ready to quit tobacco. Medication options and side effects of medication discussed. Patient refused medication. Return in about 6 weeks (around 1/11/2024) for Recheck. Chief Complaint:     Chief Complaint   Patient presents with   • Annual Exam      History of Present Illness:     Adult Annual Physical       Diet and Physical Activity  Diet/Nutrition: well balanced diet, limited junk food, limited fruits/vegetables, and adequate fiber intake. Exercise: walking, moderate cardiovascular exercise, strength training exercises, and 3-4 times a week on average. General Health  Sleep: sleeps well and gets 4-6 hours of sleep on average. Hearing: significantly decreased - bilateral.  Vision: no vision problems and most recent eye exam >1 year ago. Dental: regular dental visits, brushes teeth twice daily, and flosses teeth occasionally. Depression Screening  PHQ-2/9 Depression Screening    Little interest or pleasure in doing things: 0 - not at all  Feeling down, depressed, or hopeless: 0 - not at all  PHQ-2 Score: 0  PHQ-2 Interpretation: Negative depression screen          Past Medical History:     History reviewed. No pertinent past medical history.    Past Surgical History:     Past Surgical History:   Procedure Laterality Date   • WISDOM TOOTH EXTRACTION        Social History:     Social History     Socioeconomic History   • Marital status: Single     Spouse name: None   • Number of children: None   • Years of education: None   • Highest education level: None   Occupational History   • None   Tobacco Use   • Smoking status: Every Day     Packs/day: 0.50     Years: 18.00     Total pack years: 9.00     Types: Cigarettes     Start date: 2004     Last attempt to quit: 2022     Years since quittin.0   • Smokeless tobacco: Former     Types: Snuff   Vaping Use   • Vaping Use: Former   • Substances: Nicotine, THC   Substance and Sexual Activity   • Alcohol use: Not Currently   • Drug use: Not Currently     Types: Marijuana     Comment: stopped October 3, 2022   • Sexual activity: Yes   Other Topics Concern   • None   Social History Narrative   • None     Social Determinants of Health     Financial Resource Strain: Not on file   Food Insecurity: Not on file   Transportation Needs: Not on file   Physical Activity: Not on file   Stress: Not on file   Social Connections: Not on file   Intimate Partner Violence: Not on file   Housing Stability: Not on file      Family History:     Family History   Problem Relation Age of Onset   • Cancer Mother    • Breast cancer Mother    • No Known Problems Brother    • No Known Problems Brother       Current Medications:     Current Outpatient Medications   Medication Sig Dispense Refill   • clotrimazole (MYCELEX) 10 mg anahi Take 1 tablet (10 mg total) by mouth 5 (five) times a day 35 Anahi 1   • ASHWAGANDHA PO Take by mouth (Patient not taking: Reported on 2023)     • Multiple Vitamin (MULTIVITAMIN ADULT PO) Take by mouth (Patient not taking: Reported on 2023)     • pantoprazole (PROTONIX) 40 mg tablet Take 1 tablet (40 mg total) by mouth daily (Patient not taking: Reported on 10/27/2022) 30 tablet 0     No current facility-administered medications for this visit.       Allergies:     No Known Allergies     Physical Exam:   /66 (BP Location: Left arm, Patient Position: Sitting, Cuff Size: Large)   Pulse 80   Temp (!) 97.2 °F (36.2 °C) (Temporal)   Resp 16   Ht 6' 3" (1.905 m)   Wt 106 kg (233 lb)   SpO2 99%   BMI 29.12 kg/m²             I personally reviewed the recent (and prior)  lab results, the image studies, pathology, other specialty/physicians consult notes and recommendations, and outside medical records from other institutions, as appropriate. I had a lengthy discussion with the patient and shared the work-up findings. We discussed the diagnosis and management plan. I spent  50  minutes reviewing the records (labs, clinician notes, outside records, medical history, ordering medicine/tests/procedures, interpreting the imaging/labs previously done) and coordination of care as well as direct time with the patient today, of which greater than 50% of the time was spent in counseling and coordination of care with the patient/family. Carole Verduzco DO  [facility] [place]    Transcribed for Carole Verduzco DO, by  on 12/02/23 at 1:14 PM. Powered by [email or web address].

## 2023-11-30 NOTE — ASSESSMENT & PLAN NOTE
Will check the level. He is undoubtedly insufficient vitamin D, but I need to know the exact number.

## 2023-12-15 ENCOUNTER — HOSPITAL ENCOUNTER (OUTPATIENT)
Dept: RADIOLOGY | Facility: HOSPITAL | Age: 33
Discharge: HOME | End: 2023-12-15
Payer: COMMERCIAL

## 2023-12-15 DIAGNOSIS — Z76.89 ENCOUNTER TO ESTABLISH CARE WITH NEW DOCTOR: ICD-10-CM

## 2023-12-15 DIAGNOSIS — R53.83 FATIGUE, UNSPECIFIED TYPE: ICD-10-CM

## 2023-12-15 PROCEDURE — 74220 X-RAY XM ESOPHAGUS 1CNTRST: CPT

## 2024-01-11 ENCOUNTER — OFFICE VISIT (OUTPATIENT)
Dept: GASTROENTEROLOGY | Facility: AMBULARY SURGERY CENTER | Age: 34
End: 2024-01-11
Payer: COMMERCIAL

## 2024-01-11 VITALS
DIASTOLIC BLOOD PRESSURE: 80 MMHG | BODY MASS INDEX: 29.67 KG/M2 | OXYGEN SATURATION: 98 % | WEIGHT: 238.6 LBS | SYSTOLIC BLOOD PRESSURE: 134 MMHG | HEART RATE: 76 BPM | HEIGHT: 75 IN

## 2024-01-11 DIAGNOSIS — Z76.89 ENCOUNTER TO ESTABLISH CARE WITH NEW DOCTOR: ICD-10-CM

## 2024-01-11 DIAGNOSIS — R13.10 DYSPHAGIA, UNSPECIFIED TYPE: Primary | ICD-10-CM

## 2024-01-11 PROCEDURE — 99214 OFFICE O/P EST MOD 30 MIN: CPT | Performed by: INTERNAL MEDICINE

## 2024-01-11 NOTE — PATIENT INSTRUCTIONS
Scheduled date of EGD(as of today):  02/05/24   Physician performing EGD:  Dr Puckett   Location of EGD:  [facility]   Instructions reviewed with patient by:  Kate DUBON   Clearances: n/a

## 2024-01-11 NOTE — PROGRESS NOTES
Follow-up Note -  [facility]  Catarino Douglass 1990 male         ASSESSMENT & PLAN:    Dysphagia  Patient's symptoms appear to be most likely secondary sniffily esophagitis but the previous workup was negative.  Images from the last upper endoscopy were suggestive of Candida esophagitis but biopsies were benign.    -Schedule upper endoscopy since patient continues with symptoms    -Advised to chew well before swallowing    -Patient was explained about the lifestyle and dietary modifications.  Advised to avoid fatty foods, chocolates, caffeine, alcohol and any other triggering foods.  Avoid eating for at least 3 hours before going to bed.      Reason: Swallowing difficulty-Dr. Schmidt's patient    HPI:  Mr. Toribio has been having issues with swallowing for a while.  He had upper endoscopy by Dr. Schmidt in January 2023 which showed probable Candida esophagitis but the biopsies were negative.  He was given pantoprazole at that time but he could not tolerate because of stomach pains.  Reports having issues with swallowing still.  Denies any choking or coughing.  He was seen by his PCP recently and given Mycelex Troches without any significant help.  Barium swallow showed moderate acid reflux, small hiatal hernia.  Regular bowel movements and then seen by Donna in the stool.  Good appetite, no recent weight loss.    Chaperon: Ms. Love    REVIEW OF SYSTEMS: Review of Systems   Constitutional:  Negative for activity change, appetite change, chills, diaphoresis, fatigue, fever and unexpected weight change.   HENT:  Negative for ear discharge, ear pain, facial swelling, hearing loss, nosebleeds, sore throat, tinnitus and voice change.    Eyes:  Negative for pain, discharge, redness, itching and visual disturbance.   Respiratory:  Negative for apnea, cough, chest tightness, shortness of breath and wheezing.    Cardiovascular:  Negative for chest pain and palpitations.   Gastrointestinal:          As noted in HPI   Endocrine: Negative for cold intolerance, heat intolerance and polyuria.   Genitourinary:  Negative for difficulty urinating, dysuria, flank pain, hematuria and urgency.   Musculoskeletal:  Negative for arthralgias, back pain, gait problem, joint swelling and myalgias.   Skin:  Negative for rash and wound.   Neurological:  Negative for dizziness, tremors, seizures, speech difficulty, light-headedness, numbness and headaches.   Hematological:  Negative for adenopathy. Does not bruise/bleed easily.   Psychiatric/Behavioral:  Negative for agitation, behavioral problems and confusion. The patient is not nervous/anxious.         History reviewed. No pertinent past medical history.   Past Surgical History:   Procedure Laterality Date    WISDOM TOOTH EXTRACTION       Social History     Socioeconomic History    Marital status: Single     Spouse name: Not on file    Number of children: Not on file    Years of education: Not on file    Highest education level: Not on file   Occupational History    Not on file   Tobacco Use    Smoking status: Every Day     Current packs/day: 0.00     Average packs/day: 0.5 packs/day for 18.8 years (9.4 ttl pk-yrs)     Types: Cigarettes     Start date:      Last attempt to quit: 2022     Years since quittin.1    Smokeless tobacco: Former     Types: Snuff   Vaping Use    Vaping status: Former    Substances: Nicotine, THC   Substance and Sexual Activity    Alcohol use: Not Currently    Drug use: Not Currently     Types: Marijuana     Comment: stopped October 3, 2022    Sexual activity: Yes   Other Topics Concern    Not on file   Social History Narrative    Not on file     Social Determinants of Health     Financial Resource Strain: Not on file   Food Insecurity: Not on file   Transportation Needs: Not on file   Physical Activity: Not on file   Stress: Not on file   Social Connections: Not on file   Intimate Partner Violence: Not on file   Housing Stability: Not on  "file     Family History   Problem Relation Age of Onset    Cancer Mother     Breast cancer Mother     No Known Problems Brother     No Known Problems Brother      Patient has no known allergies.  Current Outpatient Medications   Medication Sig Dispense Refill    ASHWAGANDHA PO Take by mouth      Multiple Vitamin (MULTIVITAMIN ADULT PO) Take by mouth      clotrimazole (MYCELEX) 10 mg anahi Take 1 tablet (10 mg total) by mouth 5 (five) times a day (Patient not taking: Reported on 1/11/2024) 35 Anahi 1    pantoprazole (PROTONIX) 40 mg tablet Take 1 tablet (40 mg total) by mouth daily (Patient not taking: Reported on 10/27/2022) 30 tablet 0     No current facility-administered medications for this visit.       Blood pressure 134/80, pulse 76, height 6' 3\" (1.905 m), weight 108 kg (238 lb 9.6 oz), SpO2 98%.    PHYSICAL EXAM: Physical Exam  Constitutional:       Appearance: He is well-developed.   HENT:      Head: Normocephalic and atraumatic.   Eyes:      General: No scleral icterus.        Right eye: No discharge.         Left eye: No discharge.      Conjunctiva/sclera: Conjunctivae normal.      Pupils: Pupils are equal, round, and reactive to light.   Neck:      Thyroid: No thyromegaly.      Vascular: No JVD.      Trachea: No tracheal deviation.   Cardiovascular:      Rate and Rhythm: Normal rate and regular rhythm.      Heart sounds: Normal heart sounds. No murmur heard.     No friction rub. No gallop.   Pulmonary:      Effort: Pulmonary effort is normal. No respiratory distress.      Breath sounds: Normal breath sounds. No wheezing or rales.   Chest:      Chest wall: No tenderness.   Abdominal:      General: Bowel sounds are normal. There is no distension.      Palpations: Abdomen is soft. There is no mass.      Tenderness: There is no abdominal tenderness. There is no guarding or rebound.      Hernia: No hernia is present.   Musculoskeletal:      Cervical back: Neck supple.   Lymphadenopathy:      Cervical: No " "cervical adenopathy.   Skin:     General: Skin is warm and dry.      Findings: No erythema or rash.   Neurological:      Mental Status: He is alert and oriented to person, place, and time.   Psychiatric:         Behavior: Behavior normal.         Thought Content: Thought content normal.          Lab Results   Component Value Date    WBC 6.24 11/03/2022    HGB 15.3 11/03/2022    HCT 43.1 11/03/2022    MCV 86 11/03/2022     11/03/2022     Lab Results   Component Value Date    CALCIUM 9.1 11/03/2022    K 4.1 11/03/2022    CO2 25 11/03/2022     11/03/2022    BUN 15 11/03/2022    CREATININE 1.01 11/03/2022     Lab Results   Component Value Date    ALT 24 11/03/2022    AST  11/03/2022      Comment:      No Result; if an AST is required for patient care, it must be ordered separately.  Specimen collection should occur prior to Sulfasalazine administration due to the potential for falsely depressed results.     ALKPHOS 66 11/03/2022     No results found for: \"INR\", \"PROTIME\"    FL barium swallow ROUTINE esophagus    Result Date: 12/15/2023  Impression: Tiny sliding hiatal hernia. Moderate gastroesophageal reflux. Trace transient laryngeal vestibule penetration. Workstation performed: [id number]         "

## 2024-01-11 NOTE — ASSESSMENT & PLAN NOTE
Patient's symptoms appear to be most likely secondary sniffily esophagitis but the previous workup was negative.  Images from the last upper endoscopy were suggestive of Candida esophagitis but biopsies were benign.    -Schedule upper endoscopy since patient continues with symptoms    -Advised to chew well before swallowing    -Patient was explained about the lifestyle and dietary modifications.  Advised to avoid fatty foods, chocolates, caffeine, alcohol and any other triggering foods.  Avoid eating for at least 3 hours before going to bed.

## 2024-01-11 NOTE — H&P (VIEW-ONLY)
Follow-up Note -  [facility]  Catarino Douglass 1990 male         ASSESSMENT & PLAN:    Dysphagia  Patient's symptoms appear to be most likely secondary sniffily esophagitis but the previous workup was negative.  Images from the last upper endoscopy were suggestive of Candida esophagitis but biopsies were benign.    -Schedule upper endoscopy since patient continues with symptoms    -Advised to chew well before swallowing    -Patient was explained about the lifestyle and dietary modifications.  Advised to avoid fatty foods, chocolates, caffeine, alcohol and any other triggering foods.  Avoid eating for at least 3 hours before going to bed.      Reason: Swallowing difficulty-Dr. Schmidt's patient    HPI:  Mr. Toribio has been having issues with swallowing for a while.  He had upper endoscopy by Dr. Schmidt in January 2023 which showed probable Candida esophagitis but the biopsies were negative.  He was given pantoprazole at that time but he could not tolerate because of stomach pains.  Reports having issues with swallowing still.  Denies any choking or coughing.  He was seen by his PCP recently and given Mycelex Troches without any significant help.  Barium swallow showed moderate acid reflux, small hiatal hernia.  Regular bowel movements and then seen by Donna in the stool.  Good appetite, no recent weight loss.    Chaperon: Ms. Love    REVIEW OF SYSTEMS: Review of Systems   Constitutional:  Negative for activity change, appetite change, chills, diaphoresis, fatigue, fever and unexpected weight change.   HENT:  Negative for ear discharge, ear pain, facial swelling, hearing loss, nosebleeds, sore throat, tinnitus and voice change.    Eyes:  Negative for pain, discharge, redness, itching and visual disturbance.   Respiratory:  Negative for apnea, cough, chest tightness, shortness of breath and wheezing.    Cardiovascular:  Negative for chest pain and palpitations.   Gastrointestinal:          As noted in HPI   Endocrine: Negative for cold intolerance, heat intolerance and polyuria.   Genitourinary:  Negative for difficulty urinating, dysuria, flank pain, hematuria and urgency.   Musculoskeletal:  Negative for arthralgias, back pain, gait problem, joint swelling and myalgias.   Skin:  Negative for rash and wound.   Neurological:  Negative for dizziness, tremors, seizures, speech difficulty, light-headedness, numbness and headaches.   Hematological:  Negative for adenopathy. Does not bruise/bleed easily.   Psychiatric/Behavioral:  Negative for agitation, behavioral problems and confusion. The patient is not nervous/anxious.         History reviewed. No pertinent past medical history.   Past Surgical History:   Procedure Laterality Date    WISDOM TOOTH EXTRACTION       Social History     Socioeconomic History    Marital status: Single     Spouse name: Not on file    Number of children: Not on file    Years of education: Not on file    Highest education level: Not on file   Occupational History    Not on file   Tobacco Use    Smoking status: Every Day     Current packs/day: 0.00     Average packs/day: 0.5 packs/day for 18.8 years (9.4 ttl pk-yrs)     Types: Cigarettes     Start date:      Last attempt to quit: 2022     Years since quittin.1    Smokeless tobacco: Former     Types: Snuff   Vaping Use    Vaping status: Former    Substances: Nicotine, THC   Substance and Sexual Activity    Alcohol use: Not Currently    Drug use: Not Currently     Types: Marijuana     Comment: stopped October 3, 2022    Sexual activity: Yes   Other Topics Concern    Not on file   Social History Narrative    Not on file     Social Determinants of Health     Financial Resource Strain: Not on file   Food Insecurity: Not on file   Transportation Needs: Not on file   Physical Activity: Not on file   Stress: Not on file   Social Connections: Not on file   Intimate Partner Violence: Not on file   Housing Stability: Not on  "file     Family History   Problem Relation Age of Onset    Cancer Mother     Breast cancer Mother     No Known Problems Brother     No Known Problems Brother      Patient has no known allergies.  Current Outpatient Medications   Medication Sig Dispense Refill    ASHWAGANDHA PO Take by mouth      Multiple Vitamin (MULTIVITAMIN ADULT PO) Take by mouth      clotrimazole (MYCELEX) 10 mg anahi Take 1 tablet (10 mg total) by mouth 5 (five) times a day (Patient not taking: Reported on 1/11/2024) 35 Anahi 1    pantoprazole (PROTONIX) 40 mg tablet Take 1 tablet (40 mg total) by mouth daily (Patient not taking: Reported on 10/27/2022) 30 tablet 0     No current facility-administered medications for this visit.       Blood pressure 134/80, pulse 76, height 6' 3\" (1.905 m), weight 108 kg (238 lb 9.6 oz), SpO2 98%.    PHYSICAL EXAM: Physical Exam  Constitutional:       Appearance: He is well-developed.   HENT:      Head: Normocephalic and atraumatic.   Eyes:      General: No scleral icterus.        Right eye: No discharge.         Left eye: No discharge.      Conjunctiva/sclera: Conjunctivae normal.      Pupils: Pupils are equal, round, and reactive to light.   Neck:      Thyroid: No thyromegaly.      Vascular: No JVD.      Trachea: No tracheal deviation.   Cardiovascular:      Rate and Rhythm: Normal rate and regular rhythm.      Heart sounds: Normal heart sounds. No murmur heard.     No friction rub. No gallop.   Pulmonary:      Effort: Pulmonary effort is normal. No respiratory distress.      Breath sounds: Normal breath sounds. No wheezing or rales.   Chest:      Chest wall: No tenderness.   Abdominal:      General: Bowel sounds are normal. There is no distension.      Palpations: Abdomen is soft. There is no mass.      Tenderness: There is no abdominal tenderness. There is no guarding or rebound.      Hernia: No hernia is present.   Musculoskeletal:      Cervical back: Neck supple.   Lymphadenopathy:      Cervical: No " "cervical adenopathy.   Skin:     General: Skin is warm and dry.      Findings: No erythema or rash.   Neurological:      Mental Status: He is alert and oriented to person, place, and time.   Psychiatric:         Behavior: Behavior normal.         Thought Content: Thought content normal.          Lab Results   Component Value Date    WBC 6.24 11/03/2022    HGB 15.3 11/03/2022    HCT 43.1 11/03/2022    MCV 86 11/03/2022     11/03/2022     Lab Results   Component Value Date    CALCIUM 9.1 11/03/2022    K 4.1 11/03/2022    CO2 25 11/03/2022     11/03/2022    BUN 15 11/03/2022    CREATININE 1.01 11/03/2022     Lab Results   Component Value Date    ALT 24 11/03/2022    AST  11/03/2022      Comment:      No Result; if an AST is required for patient care, it must be ordered separately.  Specimen collection should occur prior to Sulfasalazine administration due to the potential for falsely depressed results.     ALKPHOS 66 11/03/2022     No results found for: \"INR\", \"PROTIME\"    FL barium swallow ROUTINE esophagus    Result Date: 12/15/2023  Impression: Tiny sliding hiatal hernia. Moderate gastroesophageal reflux. Trace transient laryngeal vestibule penetration. Workstation performed: [id number]         "

## 2024-01-22 ENCOUNTER — ANESTHESIA (OUTPATIENT)
Dept: ANESTHESIOLOGY | Facility: HOSPITAL | Age: 34
End: 2024-01-22

## 2024-01-22 ENCOUNTER — ANESTHESIA EVENT (OUTPATIENT)
Dept: ANESTHESIOLOGY | Facility: HOSPITAL | Age: 34
End: 2024-01-22

## 2024-01-26 ENCOUNTER — APPOINTMENT (OUTPATIENT)
Dept: LAB | Facility: CLINIC | Age: 34
End: 2024-01-26
Payer: COMMERCIAL

## 2024-01-26 DIAGNOSIS — E55.9 VITAMIN D INSUFFICIENCY: ICD-10-CM

## 2024-01-26 DIAGNOSIS — R53.83 FATIGUE, UNSPECIFIED TYPE: ICD-10-CM

## 2024-01-26 DIAGNOSIS — Z76.89 ENCOUNTER TO ESTABLISH CARE WITH NEW DOCTOR: ICD-10-CM

## 2024-01-26 LAB
25(OH)D3 SERPL-MCNC: 39.4 NG/ML (ref 30–100)
ALBUMIN SERPL BCP-MCNC: 4.4 G/DL (ref 3.5–5)
ALP SERPL-CCNC: 57 U/L (ref 34–104)
ALT SERPL W P-5'-P-CCNC: 19 U/L (ref 7–52)
ANION GAP SERPL CALCULATED.3IONS-SCNC: 3 MMOL/L
AST SERPL W P-5'-P-CCNC: 15 U/L (ref 13–39)
BILIRUB SERPL-MCNC: 1.26 MG/DL (ref 0.2–1)
BILIRUB UR QL STRIP: NEGATIVE
BUN SERPL-MCNC: 12 MG/DL (ref 5–25)
CALCIUM SERPL-MCNC: 9.5 MG/DL (ref 8.4–10.2)
CHLORIDE SERPL-SCNC: 108 MMOL/L (ref 96–108)
CHOLEST SERPL-MCNC: 141 MG/DL
CLARITY UR: CLEAR
CO2 SERPL-SCNC: 29 MMOL/L (ref 21–32)
COLOR UR: NORMAL
CREAT SERPL-MCNC: 0.87 MG/DL (ref 0.6–1.3)
CREAT UR-MCNC: 59.5 MG/DL
ERYTHROCYTE [DISTWIDTH] IN BLOOD BY AUTOMATED COUNT: 11.7 % (ref 11.6–15.1)
GFR SERPL CREATININE-BSD FRML MDRD: 112 ML/MIN/1.73SQ M
GLUCOSE P FAST SERPL-MCNC: 95 MG/DL (ref 65–99)
GLUCOSE UR STRIP-MCNC: NEGATIVE MG/DL
HCT VFR BLD AUTO: 47.4 % (ref 36.5–49.3)
HDLC SERPL-MCNC: 43 MG/DL
HGB BLD-MCNC: 16.5 G/DL (ref 12–17)
HGB UR QL STRIP.AUTO: NEGATIVE
KETONES UR STRIP-MCNC: NEGATIVE MG/DL
LDLC SERPL CALC-MCNC: 86 MG/DL (ref 0–100)
LEUKOCYTE ESTERASE UR QL STRIP: NEGATIVE
MCH RBC QN AUTO: 31.3 PG (ref 26.8–34.3)
MCHC RBC AUTO-ENTMCNC: 34.8 G/DL (ref 31.4–37.4)
MCV RBC AUTO: 90 FL (ref 82–98)
MICROALBUMIN UR-MCNC: 10.8 MG/L
MICROALBUMIN/CREAT 24H UR: 18 MG/G CREATININE (ref 0–30)
NITRITE UR QL STRIP: NEGATIVE
NONHDLC SERPL-MCNC: 98 MG/DL
PH UR STRIP.AUTO: 6 [PH]
PLATELET # BLD AUTO: 286 THOUSANDS/UL (ref 149–390)
PMV BLD AUTO: 9.7 FL (ref 8.9–12.7)
POTASSIUM SERPL-SCNC: 4.4 MMOL/L (ref 3.5–5.3)
PROT SERPL-MCNC: 7 G/DL (ref 6.4–8.4)
PROT UR STRIP-MCNC: NEGATIVE MG/DL
RBC # BLD AUTO: 5.27 MILLION/UL (ref 3.88–5.62)
SODIUM SERPL-SCNC: 140 MMOL/L (ref 135–147)
SP GR UR STRIP.AUTO: 1.02 (ref 1–1.03)
TRIGL SERPL-MCNC: 62 MG/DL
TSH SERPL DL<=0.05 MIU/L-ACNC: 0.71 UIU/ML (ref 0.45–4.5)
UROBILINOGEN UR STRIP-ACNC: <2 MG/DL
WBC # BLD AUTO: 5.56 THOUSAND/UL (ref 4.31–10.16)

## 2024-01-26 PROCEDURE — 80053 COMPREHEN METABOLIC PANEL: CPT

## 2024-01-26 PROCEDURE — 80061 LIPID PANEL: CPT

## 2024-01-26 PROCEDURE — 84443 ASSAY THYROID STIM HORMONE: CPT

## 2024-01-26 PROCEDURE — 36415 COLL VENOUS BLD VENIPUNCTURE: CPT

## 2024-01-26 PROCEDURE — 85027 COMPLETE CBC AUTOMATED: CPT

## 2024-01-26 PROCEDURE — 82306 VITAMIN D 25 HYDROXY: CPT

## 2024-02-05 ENCOUNTER — ANESTHESIA EVENT (OUTPATIENT)
Dept: GASTROENTEROLOGY | Facility: AMBULARY SURGERY CENTER | Age: 34
End: 2024-02-05

## 2024-02-05 ENCOUNTER — ANESTHESIA (OUTPATIENT)
Dept: GASTROENTEROLOGY | Facility: AMBULARY SURGERY CENTER | Age: 34
End: 2024-02-05

## 2024-02-05 ENCOUNTER — HOSPITAL ENCOUNTER (OUTPATIENT)
Dept: GASTROENTEROLOGY | Facility: AMBULARY SURGERY CENTER | Age: 34
Setting detail: OUTPATIENT SURGERY
Discharge: HOME | End: 2024-02-05
Attending: INTERNAL MEDICINE
Payer: COMMERCIAL

## 2024-02-05 VITALS
RESPIRATION RATE: 16 BRPM | HEART RATE: 87 BPM | SYSTOLIC BLOOD PRESSURE: 133 MMHG | OXYGEN SATURATION: 96 % | DIASTOLIC BLOOD PRESSURE: 87 MMHG | TEMPERATURE: 97.5 F

## 2024-02-05 DIAGNOSIS — K22.10 ESOPHAGEAL EROSIONS: Primary | ICD-10-CM

## 2024-02-05 DIAGNOSIS — R13.10 DYSPHAGIA, UNSPECIFIED TYPE: ICD-10-CM

## 2024-02-05 PROCEDURE — 88312 SPECIAL STAINS GROUP 1: CPT | Performed by: PATHOLOGY

## 2024-02-05 PROCEDURE — 88305 TISSUE EXAM BY PATHOLOGIST: CPT | Performed by: PATHOLOGY

## 2024-02-05 PROCEDURE — 43239 EGD BIOPSY SINGLE/MULTIPLE: CPT | Performed by: INTERNAL MEDICINE

## 2024-02-05 RX ORDER — PROPOFOL 10 MG/ML
INJECTION, EMULSION INTRAVENOUS AS NEEDED
Status: DISCONTINUED | OUTPATIENT
Start: 2024-02-05 | End: 2024-02-05

## 2024-02-05 RX ORDER — LIDOCAINE HYDROCHLORIDE 10 MG/ML
INJECTION, SOLUTION EPIDURAL; INFILTRATION; INTRACAUDAL; PERINEURAL AS NEEDED
Status: DISCONTINUED | OUTPATIENT
Start: 2024-02-05 | End: 2024-02-05

## 2024-02-05 RX ORDER — PANTOPRAZOLE SODIUM 40 MG/1
40 TABLET, DELAYED RELEASE ORAL DAILY
Qty: 30 TABLET | Refills: 11 | Status: SHIPPED | OUTPATIENT
Start: 2024-02-05 | End: 2024-02-05 | Stop reason: HOSPADM

## 2024-02-05 RX ORDER — ESOMEPRAZOLE MAGNESIUM 40 MG/1
40 CAPSULE, DELAYED RELEASE ORAL DAILY
Qty: 30 CAPSULE | Refills: 5 | Status: SHIPPED | OUTPATIENT
Start: 2024-02-05

## 2024-02-05 RX ORDER — SODIUM CHLORIDE, SODIUM LACTATE, POTASSIUM CHLORIDE, CALCIUM CHLORIDE 600; 310; 30; 20 MG/100ML; MG/100ML; MG/100ML; MG/100ML
INJECTION, SOLUTION INTRAVENOUS CONTINUOUS PRN
Status: DISCONTINUED | OUTPATIENT
Start: 2024-02-05 | End: 2024-02-05

## 2024-02-05 RX ADMIN — PROPOFOL 100 MG: 10 INJECTION, EMULSION INTRAVENOUS at 14:01

## 2024-02-05 RX ADMIN — PROPOFOL 100 MG: 10 INJECTION, EMULSION INTRAVENOUS at 13:59

## 2024-02-05 RX ADMIN — SODIUM CHLORIDE, SODIUM LACTATE, POTASSIUM CHLORIDE, AND CALCIUM CHLORIDE: .6; .31; .03; .02 INJECTION, SOLUTION INTRAVENOUS at 13:27

## 2024-02-05 RX ADMIN — LIDOCAINE HYDROCHLORIDE 50 MG: 10 INJECTION, SOLUTION EPIDURAL; INFILTRATION; INTRACAUDAL; PERINEURAL at 13:59

## 2024-02-05 NOTE — ANESTHESIA PREPROCEDURE EVALUATION
"Procedure:  EGD    Relevant Problems   ANESTHESIA (within normal limits)      CARDIO (within normal limits)      ENDO (within normal limits)      GI/HEPATIC   (+) Dysphagia   (+) Oropharyngeal dysphagia      /RENAL (within normal limits)      GYN (within normal limits)      HEMATOLOGY (within normal limits)      MUSCULOSKELETAL (within normal limits)      NEURO/PSYCH (within normal limits)      PULMONARY   (+) Shortness of breath      Other   (+) Fatigue      Lab Results   Component Value Date    WBC 5.56 01/26/2024    HGB 16.5 01/26/2024    HCT 47.4 01/26/2024    MCV 90 01/26/2024     01/26/2024     Lab Results   Component Value Date    SODIUM 140 01/26/2024    K 4.4 01/26/2024     01/26/2024    CO2 29 01/26/2024    AGAP 3 01/26/2024    BUN 12 01/26/2024    CREATININE 0.87 01/26/2024    GLUC 110 11/03/2022    GLUF 95 01/26/2024    CALCIUM 9.5 01/26/2024    AST 15 01/26/2024    ALT 19 01/26/2024    ALKPHOS 57 01/26/2024    TP 7.0 01/26/2024    TBILI 1.26 (H) 01/26/2024    EGFR 112 01/26/2024     No results found for: \"PTT\"  No results found for: \"INR\", \"PROTIME\"    Physical Exam    Airway    Mallampati score: II  TM Distance: >3 FB  Neck ROM: full     Dental   No notable dental hx     Cardiovascular  Rhythm: regular, Rate: normal, Cardiovascular exam normal    Pulmonary  Pulmonary exam normal Breath sounds clear to auscultation    Other Findings        Anesthesia Plan  ASA Score- 2     Anesthesia Type- IV sedation with anesthesia with ASA Monitors.         Additional Monitors:     Airway Plan:            Plan Factors-Exercise tolerance (METS): >4 METS.    Chart reviewed. EKG reviewed. Imaging results reviewed. Existing labs reviewed. Patient summary reviewed.    Patient is not a current smoker.  Patient did not smoke on day of surgery.    Obstructive sleep apnea risk education given perioperatively.        Induction- intravenous.    Postoperative Plan-     Informed Consent- Anesthetic plan and risks " discussed with patient.  I personally reviewed this patient with the CRNA. Discussed and agreed on the Anesthesia Plan with the CRNA..

## 2024-02-05 NOTE — ANESTHESIA POSTPROCEDURE EVALUATION
Post-Op Assessment Note    CV Status:  Stable  Pain Score: 0    Pain management: adequate       Mental Status:  Alert and awake   Hydration Status:  Euvolemic   PONV Controlled:  Controlled   Airway Patency:  Patent     Post Op Vitals Reviewed: Yes    No anethesia notable event occurred.    Staff: CRNA               /84 (02/05/24 1406)    Temp      Pulse 76 (02/05/24 1406)   Resp 15 (02/05/24 1406)    SpO2 98 % (02/05/24 1406)

## 2024-02-07 PROCEDURE — 88305 TISSUE EXAM BY PATHOLOGIST: CPT | Performed by: PATHOLOGY

## 2024-02-07 PROCEDURE — 88312 SPECIAL STAINS GROUP 1: CPT | Performed by: PATHOLOGY

## 2024-02-12 ENCOUNTER — TELEPHONE (OUTPATIENT)
Dept: GASTROENTEROLOGY | Facility: CLINIC | Age: 34
End: 2024-02-12

## 2024-05-22 DIAGNOSIS — Z00.6 ENCOUNTER FOR EXAMINATION FOR NORMAL COMPARISON OR CONTROL IN CLINICAL RESEARCH PROGRAM: ICD-10-CM

## 2025-03-03 DIAGNOSIS — R13.10 DYSPHAGIA, UNSPECIFIED TYPE: ICD-10-CM

## 2025-03-04 RX ORDER — PANTOPRAZOLE SODIUM 40 MG/1
40 TABLET, DELAYED RELEASE ORAL DAILY
Qty: 30 TABLET | Refills: 5 | Status: SHIPPED | OUTPATIENT
Start: 2025-03-04

## 2025-03-04 NOTE — TELEPHONE ENCOUNTER
Called pt to confirm if he is taking the Protonix and if he is to please call back and schedule a fu ov for a medication review,.

## 2025-03-26 ENCOUNTER — TELEPHONE (OUTPATIENT)
Dept: FAMILY MEDICINE CLINIC | Facility: CLINIC | Age: 35
End: 2025-03-26

## 2025-03-26 NOTE — TELEPHONE ENCOUNTER
Msg sent via my chart to see if still pt of dr ambrosio if so please schedule an appt if not please let us know

## 2025-07-01 DIAGNOSIS — R13.10 DYSPHAGIA, UNSPECIFIED TYPE: ICD-10-CM

## 2025-07-01 RX ORDER — PANTOPRAZOLE SODIUM 40 MG/1
40 TABLET, DELAYED RELEASE ORAL DAILY
Qty: 90 TABLET | OUTPATIENT
Start: 2025-07-01